# Patient Record
Sex: FEMALE | Race: WHITE | NOT HISPANIC OR LATINO | Employment: OTHER | URBAN - METROPOLITAN AREA
[De-identification: names, ages, dates, MRNs, and addresses within clinical notes are randomized per-mention and may not be internally consistent; named-entity substitution may affect disease eponyms.]

---

## 2017-01-18 ENCOUNTER — APPOINTMENT (OUTPATIENT)
Dept: WOMENS IMAGING | Facility: HOSPITAL | Age: 57
End: 2017-01-18

## 2017-01-18 PROCEDURE — 77067 SCR MAMMO BI INCL CAD: CPT | Performed by: RADIOLOGY

## 2017-01-18 PROCEDURE — 77063 BREAST TOMOSYNTHESIS BI: CPT | Performed by: RADIOLOGY

## 2017-02-08 ENCOUNTER — OFFICE VISIT (OUTPATIENT)
Dept: INTERNAL MEDICINE | Facility: CLINIC | Age: 57
End: 2017-02-08

## 2017-02-08 VITALS
DIASTOLIC BLOOD PRESSURE: 63 MMHG | HEIGHT: 63 IN | TEMPERATURE: 98.2 F | HEART RATE: 68 BPM | BODY MASS INDEX: 27.46 KG/M2 | WEIGHT: 155 LBS | SYSTOLIC BLOOD PRESSURE: 108 MMHG | OXYGEN SATURATION: 96 %

## 2017-02-08 DIAGNOSIS — J30.1 NON-SEASONAL ALLERGIC RHINITIS DUE TO POLLEN: ICD-10-CM

## 2017-02-08 DIAGNOSIS — G25.81 RLS (RESTLESS LEGS SYNDROME): ICD-10-CM

## 2017-02-08 DIAGNOSIS — L91.8 SKIN TAGS, MULTIPLE ACQUIRED: ICD-10-CM

## 2017-02-08 DIAGNOSIS — M81.0 OSTEOPOROSIS: ICD-10-CM

## 2017-02-08 DIAGNOSIS — M77.41 METATARSALGIA OF BOTH FEET: ICD-10-CM

## 2017-02-08 DIAGNOSIS — K21.9 GASTROESOPHAGEAL REFLUX DISEASE WITHOUT ESOPHAGITIS: ICD-10-CM

## 2017-02-08 DIAGNOSIS — G47.39 OTHER SLEEP APNEA: ICD-10-CM

## 2017-02-08 DIAGNOSIS — R09.89 BILATERAL CAROTID BRUITS: ICD-10-CM

## 2017-02-08 DIAGNOSIS — Z00.00 HEALTH CARE MAINTENANCE: Primary | ICD-10-CM

## 2017-02-08 DIAGNOSIS — M77.42 METATARSALGIA OF BOTH FEET: ICD-10-CM

## 2017-02-08 DIAGNOSIS — G60.9 IDIOPATHIC PERIPHERAL NEUROPATHY: ICD-10-CM

## 2017-02-08 PROBLEM — G47.30 SLEEP APNEA: Status: ACTIVE | Noted: 2017-02-08

## 2017-02-08 LAB
ALBUMIN SERPL-MCNC: 4.5 G/DL (ref 3.5–5.2)
ALBUMIN/GLOB SERPL: 1.5 G/DL
ALP SERPL-CCNC: 66 U/L (ref 39–117)
ALT SERPL-CCNC: 17 U/L (ref 1–33)
APPEARANCE UR: (no result)
AST SERPL-CCNC: 18 U/L (ref 1–32)
BACTERIA #/AREA URNS HPF: ABNORMAL /HPF
BASOPHILS # BLD AUTO: 0.02 10*3/MM3 (ref 0–0.2)
BASOPHILS NFR BLD AUTO: 0.3 % (ref 0–1.5)
BILIRUB SERPL-MCNC: 0.4 MG/DL (ref 0.1–1.2)
BILIRUB UR QL STRIP: NEGATIVE
BUN SERPL-MCNC: 15 MG/DL (ref 6–20)
BUN/CREAT SERPL: 17.4 (ref 7–25)
CALCIUM SERPL-MCNC: 9.6 MG/DL (ref 8.6–10.5)
CASTS URNS MICRO: ABNORMAL
CHLORIDE SERPL-SCNC: 102 MMOL/L (ref 98–107)
CHOLEST SERPL-MCNC: 244 MG/DL (ref 0–200)
CO2 SERPL-SCNC: 31.7 MMOL/L (ref 22–29)
COLOR UR: YELLOW
CREAT SERPL-MCNC: 0.86 MG/DL (ref 0.57–1)
EOSINOPHIL # BLD AUTO: 0.07 10*3/MM3 (ref 0–0.7)
EOSINOPHIL NFR BLD AUTO: 0.9 % (ref 0.3–6.2)
EPI CELLS #/AREA URNS HPF: ABNORMAL /HPF
ERYTHROCYTE [DISTWIDTH] IN BLOOD BY AUTOMATED COUNT: 13.1 % (ref 11.7–13)
GLOBULIN SER CALC-MCNC: 3 GM/DL
GLUCOSE SERPL-MCNC: 97 MG/DL (ref 65–99)
GLUCOSE UR QL: NEGATIVE
HCT VFR BLD AUTO: 40.5 % (ref 35.6–45.5)
HDLC SERPL-MCNC: 113 MG/DL (ref 40–60)
HGB BLD-MCNC: 13.1 G/DL (ref 11.9–15.5)
HGB UR QL STRIP: NEGATIVE
IMM GRANULOCYTES # BLD: 0 10*3/MM3 (ref 0–0.03)
IMM GRANULOCYTES NFR BLD: 0 % (ref 0–0.5)
KETONES UR QL STRIP: NEGATIVE
LDLC SERPL CALC-MCNC: 118 MG/DL (ref 0–100)
LDLC/HDLC SERPL: 1.04 {RATIO}
LEUKOCYTE ESTERASE UR QL STRIP: NEGATIVE
LYMPHOCYTES # BLD AUTO: 3.49 10*3/MM3 (ref 0.9–4.8)
LYMPHOCYTES NFR BLD AUTO: 45 % (ref 19.6–45.3)
MCH RBC QN AUTO: 30.1 PG (ref 26.9–32)
MCHC RBC AUTO-ENTMCNC: 32.3 G/DL (ref 32.4–36.3)
MCV RBC AUTO: 93.1 FL (ref 80.5–98.2)
MONOCYTES # BLD AUTO: 0.69 10*3/MM3 (ref 0.2–1.2)
MONOCYTES NFR BLD AUTO: 8.9 % (ref 5–12)
NEUTROPHILS # BLD AUTO: 3.49 10*3/MM3 (ref 1.9–8.1)
NEUTROPHILS NFR BLD AUTO: 44.9 % (ref 42.7–76)
NITRITE UR QL STRIP: NEGATIVE
PH UR STRIP: 8 [PH] (ref 5–8)
PLATELET # BLD AUTO: 317 10*3/MM3 (ref 140–500)
POTASSIUM SERPL-SCNC: 4.1 MMOL/L (ref 3.5–5.2)
PROT SERPL-MCNC: 7.5 G/DL (ref 6–8.5)
PROT UR QL STRIP: (no result)
RBC # BLD AUTO: 4.35 10*6/MM3 (ref 3.9–5.2)
RBC #/AREA URNS HPF: ABNORMAL /HPF
SODIUM SERPL-SCNC: 145 MMOL/L (ref 136–145)
SP GR UR: 1.02 (ref 1–1.03)
T4 FREE SERPL-MCNC: 1.1 NG/DL (ref 0.93–1.7)
TRIGL SERPL-MCNC: 66 MG/DL (ref 0–150)
TSH SERPL DL<=0.005 MIU/L-ACNC: 1.5 MIU/ML (ref 0.27–4.2)
UROBILINOGEN UR STRIP-MCNC: (no result) MG/DL
VLDLC SERPL CALC-MCNC: 13.2 MG/DL (ref 5–40)
WBC # BLD AUTO: 7.76 10*3/MM3 (ref 4.5–10.7)
WBC #/AREA URNS HPF: ABNORMAL /HPF

## 2017-02-08 PROCEDURE — 93000 ELECTROCARDIOGRAM COMPLETE: CPT | Performed by: INTERNAL MEDICINE

## 2017-02-08 PROCEDURE — 99214 OFFICE O/P EST MOD 30 MIN: CPT | Performed by: INTERNAL MEDICINE

## 2017-02-08 PROCEDURE — 99396 PREV VISIT EST AGE 40-64: CPT | Performed by: INTERNAL MEDICINE

## 2017-02-08 RX ORDER — BUPROPION HYDROCHLORIDE 100 MG/1
100 TABLET ORAL 2 TIMES DAILY
Qty: 180 TABLET | Refills: 1 | Status: SHIPPED | OUTPATIENT
Start: 2017-02-08 | End: 2018-08-30

## 2017-02-08 NOTE — PROGRESS NOTES
Procedure     ECG 12 Lead  Date/Time: 2/8/2017 10:20 AM  Performed by: BOUCHRA TODD  Authorized by: BOUCHRA TODD   Comparison: not compared with previous ECG   Previous ECG: no previous ECG available  Rhythm: sinus rhythm  Rate: normal  Conduction: conduction normal  ST Segments: ST segments normal  T Waves: T waves normal  QRS axis: normal  Other: no other findings  Clinical impression: normal ECG

## 2017-02-08 NOTE — PROGRESS NOTES
Subjective   Citlali Bocanegra is a 56 y.o. female.   She is here today for complete physical exam along with allergic rhinitis bilateral carotid bruits idiopathic peripheral neuropathy restless leg syndrome metatarsalgia both feet multiple prior skin tags and GERD along with sleep apnea  History of Present Illness   She is here today for complete physical exam except for gynecological part along with allergic rhinitis bilateral carotid bruits idiopathic peripheral neuropathy restless leg syndrome metatarsalgia both feet multiple skin tags and GERD along with sleep apnea  The following portions of the patient's history were reviewed and updated as appropriate: allergies, current medications, past family history, past medical history, past social history, past surgical history and problem list.    Review of Systems   Musculoskeletal: Positive for arthralgias (multiple joints and both feet).   Skin:        Multiple skin tags on thorax   Neurological: Numbness: peripheral neuropathy.   Psychiatric/Behavioral: Positive for dysphoric mood and sleep disturbance (sleep apnea).   All other systems reviewed and are negative.      Objective   Physical Exam   Constitutional: She is oriented to person, place, and time. Vital signs are normal. She appears well-developed and well-nourished. She is active.   HENT:   Head: Normocephalic and atraumatic.   Right Ear: Hearing, tympanic membrane, external ear and ear canal normal.   Left Ear: Hearing, tympanic membrane, external ear and ear canal normal.   Nose: Nose normal.   Mouth/Throat: Oropharynx is clear and moist.   Eyes: Conjunctivae, EOM and lids are normal. Pupils are equal, round, and reactive to light. Right eye exhibits no discharge. Left eye exhibits no discharge.   Neck: Trachea normal, normal range of motion, full passive range of motion without pain and phonation normal. Neck supple. Carotid bruit is present (bilateral). No edema present. No thyroid mass and no thyromegaly  present.   Cardiovascular: Normal rate, regular rhythm, normal heart sounds, intact distal pulses and normal pulses.  Exam reveals no gallop and no friction rub.    No murmur heard.  Pulmonary/Chest: Effort normal and breath sounds normal. No respiratory distress. She has no wheezes. She has no rales.   Abdominal: Soft. Normal appearance, normal aorta and bowel sounds are normal. She exhibits no distension, no abdominal bruit and no mass. There is no hepatosplenomegaly. There is no tenderness. There is no rebound, no guarding and no CVA tenderness. No hernia. Hernia confirmed negative in the right inguinal area and confirmed negative in the left inguinal area.   Musculoskeletal: Normal range of motion. She exhibits no edema or tenderness.       Vascular Status -  Her exam exhibits right foot vasculature normal. Her exam exhibits no right foot edema. Her exam exhibits left foot vasculature normal. Her exam exhibits no left foot edema.   Skin Integrity  -  Her right foot skin is intact.     Citlali 's left foot skin is intact. .     Lymphadenopathy:     She has no cervical adenopathy.     She has no axillary adenopathy.        Right: No inguinal and no supraclavicular adenopathy present.        Left: No inguinal and no supraclavicular adenopathy present.   Neurological: She is alert and oriented to person, place, and time. She has normal strength. No cranial nerve deficit or sensory deficit. She exhibits normal muscle tone. She displays a negative Romberg sign. Coordination normal.   Skin: Skin is warm, dry and intact. No cyanosis. Nails show no clubbing.        Psychiatric: She has a normal mood and affect. Her speech is normal and behavior is normal. Judgment and thought content normal. Cognition and memory are normal.   Nursing note and vitals reviewed.      Assessment/Plan   Diagnoses and all orders for this visit:    Health care maintenance  -     CBC & Differential  -     Comprehensive Metabolic Panel  -     T4,  Free  -     TSH  -     Urinalysis With Microscopic  -     Lipid Panel With LDL / HDL Ratio  -     XR Chest PA & Lateral  -     ECG 12 Lead    Non-seasonal allergic rhinitis due to pollen  -     CBC & Differential  -     Comprehensive Metabolic Panel  -     T4, Free  -     TSH  -     Urinalysis With Microscopic  -     Lipid Panel With LDL / HDL Ratio    Bilateral carotid bruits  -     CBC & Differential  -     Comprehensive Metabolic Panel  -     T4, Free  -     TSH  -     Urinalysis With Microscopic  -     Lipid Panel With LDL / HDL Ratio    Idiopathic peripheral neuropathy  -     CBC & Differential  -     Comprehensive Metabolic Panel  -     T4, Free  -     TSH  -     Urinalysis With Microscopic  -     Lipid Panel With LDL / HDL Ratio    RLS (restless legs syndrome)  -     CBC & Differential  -     Comprehensive Metabolic Panel  -     T4, Free  -     TSH  -     Urinalysis With Microscopic  -     Lipid Panel With LDL / HDL Ratio    Metatarsalgia of both feet  -     CBC & Differential  -     Comprehensive Metabolic Panel  -     T4, Free  -     TSH  -     Urinalysis With Microscopic  -     Lipid Panel With LDL / HDL Ratio    Osteoporosis  -     CBC & Differential  -     Comprehensive Metabolic Panel  -     T4, Free  -     TSH  -     Urinalysis With Microscopic  -     Lipid Panel With LDL / HDL Ratio    Skin tags, multiple acquired  -     Ambulatory Referral to Dermatology    Gastroesophageal reflux disease without esophagitis    Other sleep apnea  -     Ambulatory Referral to Sleep Medicine    Other orders  -     buPROPion (WELLBUTRIN) 100 MG tablet; Take 1 tablet by mouth 2 (Two) Times a Day.  -     Microscopic Examination      Healthcare maintenance fasting labs vaccines colonoscopies pelvic exams mammograms DEXA scans on a regular basis  Osteoporosis regular DEXA scans bisphosphonates calcium vitamin D  Skin tags refer to dermatology  GERD stable on current medication  Sleep apnea refer to sleep  medicine  Depression stable and we will try bupropion  Metatarsalgia both feet refer to orthopedic surgery as needed  Restless leg syndrome supportive meds for this  Bilateral carotid bruits regular carotid Dopplers  Allergic rhinitis supportive meds for this  Idiopathic peripheral neuropathy supportive meds

## 2017-02-09 ENCOUNTER — HOSPITAL ENCOUNTER (OUTPATIENT)
Dept: GENERAL RADIOLOGY | Facility: HOSPITAL | Age: 57
Discharge: HOME | End: 2017-02-09
Attending: INTERNAL MEDICINE | Admitting: INTERNAL MEDICINE

## 2017-02-09 PROCEDURE — 71020 HC CHEST PA AND LATERAL: CPT

## 2017-03-16 RX ORDER — CITALOPRAM 10 MG/1
TABLET ORAL
Qty: 30 TABLET | Refills: 5 | Status: SHIPPED | OUTPATIENT
Start: 2017-03-16 | End: 2017-09-28 | Stop reason: SDUPTHER

## 2017-04-21 ENCOUNTER — TELEPHONE (OUTPATIENT)
Dept: INTERNAL MEDICINE | Facility: CLINIC | Age: 57
End: 2017-04-21

## 2017-04-21 RX ORDER — MECLIZINE HYDROCHLORIDE 25 MG/1
25 TABLET ORAL 3 TIMES DAILY PRN
Qty: 30 TABLET | Refills: 0 | Status: SHIPPED | OUTPATIENT
Start: 2017-04-21 | End: 2018-03-12 | Stop reason: SDUPTHER

## 2017-05-01 ENCOUNTER — APPOINTMENT (OUTPATIENT)
Dept: SLEEP MEDICINE | Facility: HOSPITAL | Age: 57
End: 2017-05-01

## 2017-09-28 RX ORDER — CITALOPRAM 10 MG/1
TABLET ORAL
Qty: 30 TABLET | Refills: 0 | Status: SHIPPED | OUTPATIENT
Start: 2017-09-28 | End: 2017-10-27 | Stop reason: SDUPTHER

## 2017-10-30 RX ORDER — CITALOPRAM 10 MG/1
TABLET ORAL
Qty: 30 TABLET | Refills: 5 | Status: SHIPPED | OUTPATIENT
Start: 2017-10-30 | End: 2018-04-21 | Stop reason: SDUPTHER

## 2017-12-18 DIAGNOSIS — L60.0 IGTN (INGROWING TOE NAIL): Primary | ICD-10-CM

## 2018-01-29 ENCOUNTER — OFFICE (OUTPATIENT)
Dept: URBAN - METROPOLITAN AREA OTHER 6 | Facility: OTHER | Age: 58
End: 2018-01-29

## 2018-01-29 VITALS
HEART RATE: 68 BPM | DIASTOLIC BLOOD PRESSURE: 66 MMHG | HEIGHT: 63 IN | WEIGHT: 165 LBS | SYSTOLIC BLOOD PRESSURE: 112 MMHG

## 2018-01-29 DIAGNOSIS — K21.9 GASTRO-ESOPHAGEAL REFLUX DISEASE WITHOUT ESOPHAGITIS: ICD-10-CM

## 2018-01-29 PROCEDURE — 99213 OFFICE O/P EST LOW 20 MIN: CPT

## 2018-04-23 RX ORDER — CITALOPRAM 10 MG/1
TABLET ORAL
Qty: 30 TABLET | Refills: 0 | Status: SHIPPED | OUTPATIENT
Start: 2018-04-23 | End: 2018-04-26 | Stop reason: SDUPTHER

## 2018-04-27 RX ORDER — CITALOPRAM 10 MG/1
TABLET ORAL
Qty: 30 TABLET | Refills: 5 | Status: SHIPPED | OUTPATIENT
Start: 2018-04-27 | End: 2018-11-17 | Stop reason: SDUPTHER

## 2018-06-28 ENCOUNTER — TELEPHONE (OUTPATIENT)
Dept: INTERNAL MEDICINE | Facility: CLINIC | Age: 58
End: 2018-06-28

## 2018-06-28 RX ORDER — DOXYCYCLINE HYCLATE 100 MG
100 TABLET ORAL 2 TIMES DAILY
Qty: 20 TABLET | Refills: 0 | Status: SHIPPED | OUTPATIENT
Start: 2018-06-28 | End: 2018-08-30

## 2018-08-27 ENCOUNTER — TELEPHONE (OUTPATIENT)
Dept: INTERNAL MEDICINE | Facility: CLINIC | Age: 58
End: 2018-08-27

## 2018-08-28 NOTE — TELEPHONE ENCOUNTER
Pt would like to know if an antibiotic would help or if there is a cream that would help her. Please advise.

## 2018-08-30 ENCOUNTER — OFFICE VISIT (OUTPATIENT)
Dept: INTERNAL MEDICINE | Facility: CLINIC | Age: 58
End: 2018-08-30

## 2018-08-30 VITALS
BODY MASS INDEX: 28.35 KG/M2 | RESPIRATION RATE: 20 BRPM | HEART RATE: 70 BPM | TEMPERATURE: 98.5 F | HEIGHT: 63 IN | OXYGEN SATURATION: 96 % | SYSTOLIC BLOOD PRESSURE: 100 MMHG | WEIGHT: 160 LBS | DIASTOLIC BLOOD PRESSURE: 65 MMHG

## 2018-08-30 DIAGNOSIS — M75.81 ROTATOR CUFF TENDINITIS, RIGHT: ICD-10-CM
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
## 2018-08-30 DIAGNOSIS — S90.862S: ICD-10-CM

## 2018-08-30 DIAGNOSIS — K60.2 RECTAL FISSURE: ICD-10-CM

## 2018-08-30 DIAGNOSIS — L91.8 SKIN TAGS, MULTIPLE ACQUIRED: ICD-10-CM

## 2018-08-30 DIAGNOSIS — M81.6 LOCALIZED OSTEOPOROSIS WITHOUT CURRENT PATHOLOGICAL FRACTURE: ICD-10-CM

## 2018-08-30 DIAGNOSIS — Z00.00 HEALTH CARE MAINTENANCE: Primary | ICD-10-CM

## 2018-08-30 DIAGNOSIS — F32.0 MILD SINGLE CURRENT EPISODE OF MAJOR DEPRESSIVE DISORDER (HCC): ICD-10-CM

## 2018-08-30 DIAGNOSIS — K62.5 BRBPR (BRIGHT RED BLOOD PER RECTUM): ICD-10-CM

## 2018-08-30 DIAGNOSIS — K21.9 GASTROESOPHAGEAL REFLUX DISEASE WITHOUT ESOPHAGITIS: ICD-10-CM

## 2018-08-30 DIAGNOSIS — J30.1 CHRONIC SEASONAL ALLERGIC RHINITIS DUE TO POLLEN: ICD-10-CM

## 2018-08-30 PROCEDURE — 99214 OFFICE O/P EST MOD 30 MIN: CPT | Performed by: INTERNAL MEDICINE

## 2018-08-30 PROCEDURE — 99396 PREV VISIT EST AGE 40-64: CPT | Performed by: INTERNAL MEDICINE

## 2018-08-30 RX ORDER — AZELASTINE 1 MG/ML
2 SPRAY, METERED NASAL DAILY
COMMUNITY
Start: 2018-08-08

## 2018-08-30 RX ORDER — OMEPRAZOLE 40 MG/1
CAPSULE, DELAYED RELEASE ORAL
COMMUNITY
Start: 2018-08-08 | End: 2020-02-06

## 2018-08-30 RX ORDER — METRONIDAZOLE 500 MG/1
500 TABLET ORAL 3 TIMES DAILY
Qty: 36 TABLET | Refills: 0 | Status: SHIPPED | OUTPATIENT
Start: 2018-08-30 | End: 2018-10-03

## 2018-08-30 RX ORDER — CIPROFLOXACIN 500 MG/1
500 TABLET, FILM COATED ORAL EVERY 12 HOURS SCHEDULED
Qty: 24 TABLET | Refills: 0 | Status: SHIPPED | OUTPATIENT
Start: 2018-08-30 | End: 2018-10-03

## 2018-09-04 ENCOUNTER — HOSPITAL ENCOUNTER (OUTPATIENT)
Dept: MRI IMAGING | Facility: HOSPITAL | Age: 58
Discharge: HOME | End: 2018-09-04

## 2018-09-05 LAB
ALBUMIN SERPL-MCNC: 4.2 G/DL (ref 3.5–5.2)
ALBUMIN/GLOB SERPL: 1.4 G/DL
ALP SERPL-CCNC: 73 U/L (ref 39–117)
ALT SERPL-CCNC: 18 U/L (ref 1–33)
APPEARANCE UR: CLEAR
AST SERPL-CCNC: 17 U/L (ref 1–32)
B BURGDOR IGG+IGM SER-ACNC: <0.91 ISR (ref 0–0.9)
B BURGDOR IGM SER IA-ACNC: <0.8 INDEX (ref 0–0.79)
BACTERIA #/AREA URNS HPF: ABNORMAL /HPF
BASOPHILS # BLD AUTO: 0.02 10*3/MM3 (ref 0–0.2)
BASOPHILS NFR BLD AUTO: 0.3 % (ref 0–1.5)
BILIRUB SERPL-MCNC: 0.4 MG/DL (ref 0.1–1.2)
BILIRUB UR QL STRIP: NEGATIVE
BUN SERPL-MCNC: 12 MG/DL (ref 8–23)
BUN/CREAT SERPL: 15.4 (ref 7–25)
CALCIUM SERPL-MCNC: 9.6 MG/DL (ref 8.6–10.5)
CASTS URNS MICRO: ABNORMAL
CHLORIDE SERPL-SCNC: 103 MMOL/L (ref 98–107)
CHOLEST SERPL-MCNC: 243 MG/DL (ref 0–200)
CO2 SERPL-SCNC: 26.3 MMOL/L (ref 22–29)
COLOR UR: YELLOW
CREAT SERPL-MCNC: 0.78 MG/DL (ref 0.57–1)
EOSINOPHIL # BLD AUTO: 0.07 10*3/MM3 (ref 0–0.7)
EOSINOPHIL NFR BLD AUTO: 1.1 % (ref 0.3–6.2)
EPI CELLS #/AREA URNS HPF: ABNORMAL /HPF
ERYTHROCYTE [DISTWIDTH] IN BLOOD BY AUTOMATED COUNT: 13.1 % (ref 11.7–13)
GLOBULIN SER CALC-MCNC: 3.1 GM/DL
GLUCOSE SERPL-MCNC: 90 MG/DL (ref 65–99)
GLUCOSE UR QL: NEGATIVE
HCT VFR BLD AUTO: 42 % (ref 35.6–45.5)
HDLC SERPL-MCNC: 91 MG/DL (ref 40–60)
HGB BLD-MCNC: 13.1 G/DL (ref 11.9–15.5)
HGB UR QL STRIP: NEGATIVE
IMM GRANULOCYTES # BLD: 0 10*3/MM3 (ref 0–0.03)
IMM GRANULOCYTES NFR BLD: 0 % (ref 0–0.5)
KETONES UR QL STRIP: NEGATIVE
LDLC SERPL CALC-MCNC: 137 MG/DL (ref 0–100)
LDLC/HDLC SERPL: 1.51 {RATIO}
LEUKOCYTE ESTERASE UR QL STRIP: NEGATIVE
LYMPHOCYTES # BLD AUTO: 2.95 10*3/MM3 (ref 0.9–4.8)
LYMPHOCYTES NFR BLD AUTO: 48 % (ref 19.6–45.3)
MCH RBC QN AUTO: 28.7 PG (ref 26.9–32)
MCHC RBC AUTO-ENTMCNC: 31.2 G/DL (ref 32.4–36.3)
MCV RBC AUTO: 92.1 FL (ref 80.5–98.2)
MONOCYTES # BLD AUTO: 0.51 10*3/MM3 (ref 0.2–1.2)
MONOCYTES NFR BLD AUTO: 8.3 % (ref 5–12)
NEUTROPHILS # BLD AUTO: 2.6 10*3/MM3 (ref 1.9–8.1)
NEUTROPHILS NFR BLD AUTO: 42.3 % (ref 42.7–76)
NITRITE UR QL STRIP: NEGATIVE
PH UR STRIP: 7 [PH] (ref 5–8)
PLATELET # BLD AUTO: 307 10*3/MM3 (ref 140–500)
POTASSIUM SERPL-SCNC: 4.1 MMOL/L (ref 3.5–5.2)
PROT SERPL-MCNC: 7.3 G/DL (ref 6–8.5)
PROT UR QL STRIP: NEGATIVE
R RICKETTSI IGG SER QL IA: NEGATIVE
R RICKETTSI IGM SER-ACNC: 0.18 INDEX (ref 0–0.89)
RBC # BLD AUTO: 4.56 10*6/MM3 (ref 3.9–5.2)
RBC #/AREA URNS HPF: ABNORMAL /HPF
SODIUM SERPL-SCNC: 141 MMOL/L (ref 136–145)
SP GR UR: 1.02 (ref 1–1.03)
T4 FREE SERPL-MCNC: 1.19 NG/DL (ref 0.93–1.7)
TRIGL SERPL-MCNC: 74 MG/DL (ref 0–150)
TSH SERPL DL<=0.005 MIU/L-ACNC: 2.21 MIU/ML (ref 0.27–4.2)
UROBILINOGEN UR STRIP-MCNC: (no result) MG/DL
VLDLC SERPL CALC-MCNC: 14.8 MG/DL (ref 5–40)
WBC # BLD AUTO: 6.15 10*3/MM3 (ref 4.5–10.7)
WBC #/AREA URNS HPF: ABNORMAL /HPF

## 2018-09-07 ENCOUNTER — TELEPHONE (OUTPATIENT)
Dept: INTERNAL MEDICINE | Facility: CLINIC | Age: 58
End: 2018-09-07

## 2018-09-13 NOTE — PROGRESS NOTES
Subjective   Citlali Bocanegra is a 58 y.o. female.   She is here today for complete physical exam along with bright red blood per rectum and rectal fissure and GERD and osteoporosis and depression and allergic rhinitis and right rotator cuff tendinitis and tick bite of the foot on the left follow-up and multiple skin tags  History of Present Illness   She is here today for him for complete physical exam along with bright red blood per rectum which is not stable and rectal fissure which is not stable and GERD which is stable on current medication and osteoporosis which has been stable and depression which is stable on current medication and allergic rhinitis which is stable on current medication and right rotator cuff tendinitis which is not stable and tick bite on the foot on the left which is getting worse according to her and multiple skin tags which bother her as well  The following portions of the patient's history were reviewed and updated as appropriate: allergies, current medications, past family history, past medical history, past social history, past surgical history and problem list.    Review of Systems   Gastrointestinal: Positive for anal bleeding and rectal pain.   Skin:        Multiple skin tags and tick bite of left foot   All other systems reviewed and are negative.      Objective   Physical Exam   Constitutional: She is oriented to person, place, and time. Vital signs are normal. She appears well-developed and well-nourished. She is active.   HENT:   Head: Normocephalic and atraumatic.   Right Ear: Hearing, tympanic membrane, external ear and ear canal normal.   Left Ear: Hearing, tympanic membrane, external ear and ear canal normal.   Nose: Nose normal.   Mouth/Throat: Uvula is midline, oropharynx is clear and moist and mucous membranes are normal.   Eyes: Pupils are equal, round, and reactive to light. Conjunctivae, EOM and lids are normal. Right eye exhibits no discharge. Left eye exhibits no  discharge.   Neck: Trachea normal, normal range of motion, full passive range of motion without pain and phonation normal. Neck supple. Carotid bruit is not present. No edema present. No thyroid mass and no thyromegaly present.   Cardiovascular: Normal rate, regular rhythm, normal heart sounds, intact distal pulses and normal pulses.  Exam reveals no gallop and no friction rub.    No murmur heard.  Pulmonary/Chest: Effort normal and breath sounds normal. No respiratory distress. She has no wheezes. She has no rales. Right breast exhibits no inverted nipple, no mass, no nipple discharge, no skin change and no tenderness. Left breast exhibits no inverted nipple, no mass, no nipple discharge, no skin change and no tenderness. Breasts are symmetrical. There is no breast swelling.   Abdominal: Soft. Normal appearance, normal aorta and bowel sounds are normal. She exhibits no distension, no abdominal bruit and no mass. There is no hepatosplenomegaly. There is no tenderness. There is no rebound, no guarding and no CVA tenderness. No hernia. Hernia confirmed negative in the right inguinal area and confirmed negative in the left inguinal area.   Genitourinary: Rectal exam shows fissure and guaiac positive stool. No breast tenderness, discharge or bleeding.   Musculoskeletal: She exhibits no edema.        Right shoulder: She exhibits decreased range of motion, tenderness and pain.     Vascular Status -  Her right foot exhibits normal foot vasculature  and no edema. Her left foot exhibits normal foot vasculature  and no edema.  Skin Integrity  -  Her right foot skin is intact.Her left foot skin is intact..     Lymphadenopathy:     She has no cervical adenopathy.     She has no axillary adenopathy.        Right: No inguinal and no supraclavicular adenopathy present.        Left: No inguinal and no supraclavicular adenopathy present.   Neurological: She is alert and oriented to person, place, and time. She has normal strength.  No cranial nerve deficit or sensory deficit. She exhibits normal muscle tone. She displays a negative Romberg sign. Coordination normal.   Skin: Skin is warm, dry and intact. No cyanosis. Nails show no clubbing.        Psychiatric: She has a normal mood and affect. Her speech is normal and behavior is normal. Judgment and thought content normal. Cognition and memory are normal.   Nursing note and vitals reviewed.      Assessment/Plan   Citlali was seen today for annual exam and rectal tear.    Diagnoses and all orders for this visit:    Health care maintenance  -     Lipid Panel With LDL / HDL Ratio  -     CBC & Differential  -     Comprehensive Metabolic Panel  -     T4, Free  -     TSH  -     Urinalysis With Microscopic - Urine, Clean Catch  -     Ambulatory Referral For Screening Colonoscopy    Rectal fissure    BRBPR (bright red blood per rectum)    Gastroesophageal reflux disease without esophagitis    Localized osteoporosis without current pathological fracture    Mild single current episode of major depressive disorder (CMS/HCC)    Chronic seasonal allergic rhinitis due to pollen    Rotator cuff tendinitis, right  -     MRI Shoulder Right Without Contrast    Tick bite of foot, left, sequela  -     Lyme Disease IgG/IgM Antibodies  -     [place]  (IgG / M)    Skin tags, multiple acquired  -     Ambulatory Referral to Dermatology    Other orders  -     ciprofloxacin (CIPRO) 500 MG tablet; Take 1 tablet by mouth Every 12 (Twelve) Hours.  -     metroNIDAZOLE (FLAGYL) 500 MG tablet; Take 1 tablet by mouth 3 (Three) Times a Day.  -     Microscopic Examination  -     [place] SF (IgG / M)  -     B. Burgdorferi Antibodies, WB Reflex      Healthcare maintenance fasting labs vaccines colonoscopies mammograms breast exams on a regular basis  Rectal fissure noted and we will provide antibacterial medication today and get her to GI doctor if this does not improve drastically in the next few days  Bright red  blood per rectum noted and not stable and we will check labs on this and hopefully this will resolve  GERD stable on current medication no changes  Osteoporosis has been stable on current medication with no changes  Depression stable on current medication  Allergic rhinitis stable on current medication  Right rotator cuff tendinitis not stable and we will get MRI of right shoulder  Tick bite foot left follow-up we will get Lyme disease and [place] spotted fever workup  Multiple acquired skin tags we will refer to dermatology

## 2018-09-21 ENCOUNTER — HOSPITAL ENCOUNTER (OUTPATIENT)
Dept: MRI IMAGING | Facility: HOSPITAL | Age: 58
Discharge: HOME | End: 2018-09-21
Admitting: INTERNAL MEDICINE

## 2018-09-21 PROCEDURE — 73221 MRI JOINT UPR EXTREM W/O DYE: CPT

## 2018-10-03 ENCOUNTER — OFFICE VISIT (OUTPATIENT)
Dept: INTERNAL MEDICINE | Facility: CLINIC | Age: 58
End: 2018-10-03

## 2018-10-03 VITALS
HEIGHT: 63 IN | TEMPERATURE: 97.2 F | BODY MASS INDEX: 28.17 KG/M2 | HEART RATE: 55 BPM | DIASTOLIC BLOOD PRESSURE: 74 MMHG | SYSTOLIC BLOOD PRESSURE: 126 MMHG | OXYGEN SATURATION: 98 % | WEIGHT: 159 LBS

## 2018-10-03 DIAGNOSIS — M75.81 ROTATOR CUFF TENDONITIS, RIGHT: ICD-10-CM

## 2018-10-03 DIAGNOSIS — Z12.11 ENCOUNTER FOR SCREENING COLONOSCOPY: ICD-10-CM

## 2018-10-03 DIAGNOSIS — S39.92XA INJURY OF BACK, INITIAL ENCOUNTER: ICD-10-CM

## 2018-10-03 DIAGNOSIS — Z82.49 FAMILY HISTORY OF EARLY CAD: Primary | ICD-10-CM

## 2018-10-03 PROCEDURE — 99213 OFFICE O/P EST LOW 20 MIN: CPT | Performed by: INTERNAL MEDICINE

## 2018-10-18 ENCOUNTER — APPOINTMENT (OUTPATIENT)
Dept: WOMENS IMAGING | Facility: HOSPITAL | Age: 58
End: 2018-10-18

## 2018-10-18 PROCEDURE — 77063 BREAST TOMOSYNTHESIS BI: CPT | Performed by: RADIOLOGY

## 2018-10-18 PROCEDURE — 77067 SCR MAMMO BI INCL CAD: CPT | Performed by: RADIOLOGY

## 2018-10-18 NOTE — PROGRESS NOTES
Subjective   Citlali Bocanegra is a 58 y.o. female.   She is here today for family history of early CAD along with injury of back about a week ago when she slipped on the stairs of her camper/RV and here for an encounter for screening colonoscopy and also right rotator cuff tendinitis she is getting worse  History of Present Illness     The following portions of the patient's history were reviewed and updated as appropriate: allergies, current medications, past family history, past medical history, past social history, past surgical history and problem list.    Review of Systems   Respiratory: Negative for shortness of breath.    Cardiovascular: Negative for chest pain.   Musculoskeletal: Positive for arthralgias and back pain.   All other systems reviewed and are negative.      Objective   Physical Exam   Constitutional: She is oriented to person, place, and time. She appears well-developed and well-nourished. She is cooperative.   HENT:   Head: Normocephalic and atraumatic.   Right Ear: Hearing, tympanic membrane, external ear and ear canal normal.   Left Ear: Hearing, tympanic membrane, external ear and ear canal normal.   Nose: Nose normal.   Mouth/Throat: Uvula is midline, oropharynx is clear and moist and mucous membranes are normal.   Eyes: Pupils are equal, round, and reactive to light. Conjunctivae, EOM and lids are normal.   Neck: Phonation normal. Neck supple. Carotid bruit is not present.   Cardiovascular: Normal rate, regular rhythm and normal heart sounds.  Exam reveals no gallop and no friction rub.    No murmur heard.  Pulmonary/Chest: Effort normal and breath sounds normal. No respiratory distress.   Abdominal: Soft. Bowel sounds are normal. She exhibits no distension and no mass. There is no hepatosplenomegaly. There is no tenderness. There is no rebound and no guarding. No hernia.   Musculoskeletal: She exhibits no edema.        Right shoulder: She exhibits decreased range of motion and tenderness.         Lumbar back: She exhibits tenderness and pain.        Back:    Neurological: She is alert and oriented to person, place, and time. Coordination and gait normal.   Skin: Skin is warm and dry.   Psychiatric: She has a normal mood and affect. Her speech is normal and behavior is normal. Judgment and thought content normal.   Nursing note and vitals reviewed.      Assessment/Plan   Diagnoses and all orders for this visit:    Family history of early CAD    Injury of back, initial encounter    Encounter for screening colonoscopy  -     Ambulatory Referral For Screening Colonoscopy    Rotator cuff tendonitis, right  -     Ambulatory Referral to Orthopedic Surgery      Family history of early CAD this time we will follow closely  Injury of back initial encounter we will provide medication for this as needed  Encounter for screening colonoscopy we will schedule this  Right rotator cuff tendinitis getting worse and we will have her see orthopedic surgery

## 2018-10-29 DIAGNOSIS — Z12.11 ENCOUNTER FOR SCREENING COLONOSCOPY: Primary | ICD-10-CM

## 2018-11-19 ENCOUNTER — PREP FOR SURGERY (OUTPATIENT)
Dept: OTHER | Facility: HOSPITAL | Age: 58
End: 2018-11-19

## 2018-11-19 DIAGNOSIS — Z12.11 COLON CANCER SCREENING: Primary | ICD-10-CM

## 2018-11-19 RX ORDER — CITALOPRAM 10 MG/1
TABLET ORAL
Qty: 90 TABLET | Refills: 3 | Status: SHIPPED | OUTPATIENT
Start: 2018-11-19 | End: 2019-12-17 | Stop reason: SDUPTHER

## 2018-12-13 PROBLEM — Z12.11 COLON CANCER SCREENING: Status: ACTIVE | Noted: 2018-12-13

## 2019-01-10 ENCOUNTER — HOSPITAL ENCOUNTER (OUTPATIENT)
Facility: HOSPITAL | Age: 59
Setting detail: HOSPITAL OUTPATIENT SURGERY
Discharge: HOME | End: 2019-01-10
Attending: SURGERY | Admitting: SURGERY

## 2019-01-10 ENCOUNTER — ANESTHESIA EVENT (OUTPATIENT)
Dept: GASTROENTEROLOGY | Facility: HOSPITAL | Age: 59
End: 2019-01-10

## 2019-01-10 ENCOUNTER — ANESTHESIA (OUTPATIENT)
Dept: GASTROENTEROLOGY | Facility: HOSPITAL | Age: 59
End: 2019-01-10

## 2019-01-10 VITALS
TEMPERATURE: 97.8 F | OXYGEN SATURATION: 100 % | DIASTOLIC BLOOD PRESSURE: 63 MMHG | HEIGHT: 63 IN | RESPIRATION RATE: 18 BRPM | BODY MASS INDEX: 27.82 KG/M2 | HEART RATE: 62 BPM | WEIGHT: 157 LBS | SYSTOLIC BLOOD PRESSURE: 117 MMHG

## 2019-01-10 PROCEDURE — S0260 H&P FOR SURGERY: HCPCS | Performed by: SURGERY

## 2019-01-10 PROCEDURE — 25010000002 PROPOFOL 10 MG/ML EMULSION: Performed by: ANESTHESIOLOGY

## 2019-01-10 PROCEDURE — 45378 DIAGNOSTIC COLONOSCOPY: CPT | Performed by: SURGERY

## 2019-01-10 RX ORDER — PROPOFOL 10 MG/ML
VIAL (ML) INTRAVENOUS CONTINUOUS PRN
Status: DISCONTINUED | OUTPATIENT
Start: 2019-01-10 | End: 2019-01-10 | Stop reason: SURG

## 2019-01-10 RX ORDER — SODIUM CHLORIDE, SODIUM LACTATE, POTASSIUM CHLORIDE, CALCIUM CHLORIDE 600; 310; 30; 20 MG/100ML; MG/100ML; MG/100ML; MG/100ML
1000 INJECTION, SOLUTION INTRAVENOUS CONTINUOUS
Status: DISCONTINUED | OUTPATIENT
Start: 2019-01-10 | End: 2019-01-10

## 2019-01-10 RX ORDER — LIDOCAINE HYDROCHLORIDE 10 MG/ML
0.5 INJECTION, SOLUTION INFILTRATION; PERINEURAL ONCE AS NEEDED
Status: DISCONTINUED | OUTPATIENT
Start: 2019-01-10 | End: 2019-01-10 | Stop reason: HOSPADM

## 2019-01-10 RX ORDER — SODIUM CHLORIDE 0.9 % (FLUSH) 0.9 %
3 SYRINGE (ML) INJECTION AS NEEDED
Status: DISCONTINUED | OUTPATIENT
Start: 2019-01-10 | End: 2019-01-10 | Stop reason: HOSPADM

## 2019-01-10 RX ORDER — PROPOFOL 10 MG/ML
VIAL (ML) INTRAVENOUS AS NEEDED
Status: DISCONTINUED | OUTPATIENT
Start: 2019-01-10 | End: 2019-01-10 | Stop reason: SURG

## 2019-01-10 RX ORDER — SODIUM CHLORIDE 9 MG/ML
50 INJECTION, SOLUTION INTRAVENOUS ONCE
Status: COMPLETED | OUTPATIENT
Start: 2019-01-10 | End: 2019-01-10

## 2019-01-10 RX ORDER — LIDOCAINE HYDROCHLORIDE 20 MG/ML
INJECTION, SOLUTION INFILTRATION; PERINEURAL AS NEEDED
Status: DISCONTINUED | OUTPATIENT
Start: 2019-01-10 | End: 2019-01-10 | Stop reason: SURG

## 2019-01-10 RX ADMIN — PROPOFOL 100 MCG/KG/MIN: 10 INJECTION, EMULSION INTRAVENOUS at 08:14

## 2019-01-10 RX ADMIN — LIDOCAINE HYDROCHLORIDE 60 MG: 20 INJECTION, SOLUTION INFILTRATION; PERINEURAL at 08:14

## 2019-01-10 RX ADMIN — PROPOFOL 100 MG: 10 INJECTION, EMULSION INTRAVENOUS at 08:14

## 2019-01-10 RX ADMIN — SODIUM CHLORIDE 50 ML/HR: 9 INJECTION, SOLUTION INTRAVENOUS at 07:51

## 2019-01-10 NOTE — ANESTHESIA POSTPROCEDURE EVALUATION
Patient: Citlali Bocanegra    Procedure Summary     Date:  01/10/19 Room / Location:  [facility] ENDOSCOPY 4 /  [facility]    Anesthesia Start:  0806 Anesthesia Stop:  0843    Procedure:  COLONOSCOPY TO CECUM (N/A ) Diagnosis:       Colon cancer screening      (Colon cancer screening [Z12.11])    Surgeon:  Sridhar Altamirano MD Provider:  Bre Pisano MD    Anesthesia Type:  MAC ASA Status:  2          Anesthesia Type: MAC  Last vitals  BP   118/69 (01/10/19 0731)   Temp   36.6 °C (97.8 °F) (01/10/19 0731)   Pulse   60 (01/10/19 0731)   Resp   16 (01/10/19 0731)     SpO2   98 % (01/10/19 0731)     Post Anesthesia Care and Evaluation    Patient location during evaluation: bedside  Patient participation: complete - patient participated  Level of consciousness: awake  Pain management: adequate  Airway patency: patent  Anesthetic complications: No anesthetic complications    Cardiovascular status: acceptable  Respiratory status: acceptable  Hydration status: acceptable

## 2019-01-10 NOTE — ANESTHESIA PREPROCEDURE EVALUATION
Anesthesia Evaluation     NPO Solid Status: > 8 hours             Airway   Mallampati: I  TM distance: >3 FB  Neck ROM: full  No difficulty expected  Dental - normal exam     Pulmonary - normal exam   (+) sleep apnea,   (-) asthma  Cardiovascular - normal exam        Neuro/Psych  GI/Hepatic/Renal/Endo    (+)  GERD,      Musculoskeletal     Abdominal  - normal exam    Bowel sounds: normal.   Substance History      OB/GYN          Other   (+) arthritis                   Anesthesia Plan    ASA 2     MAC     intravenous induction   Anesthetic plan, all risks, benefits, and alternatives have been provided, discussed and informed consent has been obtained with: patient.

## 2019-01-10 NOTE — H&P
Subjective:   Reason for Visit: Screening colonoscopy    HPI:  Patient presents for evaluation for colon cancer screening.  There is no family history of colon neoplasia. No family hx of gastric, ovarian, or uterine cancer.  Patient denies changes in bowel habits, diarrhea, constipation, abdominal pain, decreased caliber of stool, melena, brbpr and weight loss.  There is no personal or family history of bleeding disorder or untoward reaction to anesthesia.  Patient has had a colonoscopy 10 year(s) ago.      Past Medical History:   Diagnosis Date   • Depression    • Environmental allergies    • GERD (gastroesophageal reflux disease)        Past Surgical History:   Procedure Laterality Date   • COLONOSCOPY     • ENDOSCOPY           Current Facility-Administered Medications:   •  lidocaine (XYLOCAINE) 1 % injection 0.5 mL, 0.5 mL, Intradermal, Once PRN, Sridhar Altamirano MD  •  sodium chloride 0.9 % flush 3 mL, 3 mL, Intravenous, PRN, Sridhar Altamirano MD    Facility-Administered Medications Ordered in Other Encounters:   •  lidocaine (XYLOCAINE) 2% injection, , , PRN, Bre Pisano MD, 60 mg at 01/10/19 0814  •  Propofol (DIPRIVAN) injection, , Intravenous, PRN, Bre Pisano MD, 100 mg at 01/10/19 0814  •  Propofol (DIPRIVAN) injection, , , Continuous PRN, Bre Pisano MD, Last Rate: 42.7 mL/hr at 01/10/19 0814, 100 mcg/kg/min at 01/10/19 0814    Allergies   Allergen Reactions   • Aspirin    • Entex T  [Pseudoephedrine-Guaifenesin]    • Ibuprofen        Family History   Problem Relation Age of Onset   • Heart failure Mother    • Hypertension Mother    • Heart failure Father    • Diabetes Father    • Glaucoma Father        Social History     Socioeconomic History   • Marital status:      Spouse name: Not on file   • Number of children: Not on file   • Years of education: Not on file   • Highest education level: Not on file   Social Needs   • Financial resource strain: Not on file   • Food  "insecurity - worry: Not on file   • Food insecurity - inability: Not on file   • Transportation needs - medical: Not on file   • Transportation needs - non-medical: Not on file   Occupational History   • Not on file   Tobacco Use   • Smoking status: Former Smoker     Last attempt to quit: 1/10/1981     Years since quittin.0   • Smokeless tobacco: Never Used   Substance and Sexual Activity   • Alcohol use: No   • Drug use: No   • Sexual activity: Defer   Other Topics Concern   • Not on file   Social History Narrative   • Not on file        Review Of Systems:  A comprehensive review of systems was negative.    Objective:   Physical exam:  /69 (BP Location: Left arm, Patient Position: Sitting)   Pulse 60   Temp 97.8 °F (36.6 °C) (Oral)   Resp 16   Ht 160 cm (63\")   Wt 71.2 kg (157 lb)   SpO2 98%   BMI 27.81 kg/m²     General Appearance:  awake, alert, oriented, in no acute distress and well developed, well nourished  Lungs:  Normal expansion.  Clear to auscultation.  No rales, rhonchi, or wheezing.  Heart:  Heart sounds are normal.  Regular rate and rhythm without murmur, gallop or rub.  Abdomen:  Soft, non-tender, normal bowel sounds; no bruits, organomegaly or masses.    Assessment:    Citlali Bocanegra is a 58 y.o. female who presents for evaluation for colon cancer screening.    Patient has no increased risk factors or warning signs or symptoms.  I reviewed current ACG guidelines for colon cancer screening:    A)  Flex sig q 5yrs with yearly fecal occult blood testing  B)  Virtual colonoscopy  C)  Colonoscopy with possible biopsy/polypectomy with IV sedation at appropriate       screening intervals    The patient has no family history of colon cancer.  She  has no personal history of colon polyp and colon cancer who presents for colon cancer screening evaluation.   I would advise a colonscopy.     The rationale for each option as well as all risks and benefits of each alternative were discussed with " the patient in detail.  The patient understands and does wish to proceed with Colonoscopy Screening        The rationale for colonoscopy with possible biopsy, possible polypectomy, with IV sedation as well as all risks, benefits, and alternatives were discussed with the patient in detail. Risks including but not limited to perforation, bleeding,need for blood transfusion or emergent surgery ,and missed neoplasm were reviewed in detail with the patient The patient demonstrates full understanding and wishes to proceed with the colonoscopy.

## 2019-01-11 NOTE — ADDENDUM NOTE
Addendum  created 01/10/19 2106 by Oz Hayes MD    Review and Sign - Ready for Procedure, Review and Sign - Signed

## 2019-04-18 ENCOUNTER — OFFICE VISIT (OUTPATIENT)
Dept: INTERNAL MEDICINE | Facility: CLINIC | Age: 59
End: 2019-04-18

## 2019-04-18 VITALS
TEMPERATURE: 98.9 F | HEIGHT: 63 IN | DIASTOLIC BLOOD PRESSURE: 68 MMHG | OXYGEN SATURATION: 98 % | SYSTOLIC BLOOD PRESSURE: 105 MMHG | WEIGHT: 142.6 LBS | RESPIRATION RATE: 16 BRPM | BODY MASS INDEX: 25.27 KG/M2 | HEART RATE: 60 BPM

## 2019-04-18 DIAGNOSIS — J35.8 TONSIL STONE: Primary | ICD-10-CM

## 2019-04-18 LAB
EXPIRATION DATE: NORMAL
INTERNAL CONTROL: NORMAL
Lab: NORMAL
S PYO AG THROAT QL: NEGATIVE

## 2019-04-18 PROCEDURE — 99212 OFFICE O/P EST SF 10 MIN: CPT | Performed by: NURSE PRACTITIONER

## 2019-04-18 PROCEDURE — 87880 STREP A ASSAY W/OPTIC: CPT | Performed by: NURSE PRACTITIONER

## 2019-04-18 NOTE — PROGRESS NOTES
"Subjective   Citlali Bocanegra is a 58 y.o. female.   CC: Tonsil exudate    Presents for evaluation of possible tonsil stones.  This is a 58-year-old female patient of Dr. Causey.  She has a history of osteoporosis, GERD, depression, allergic rhinitis.  She does see an allergist and takes Singulair and Zyrtec daily.  She states that yesterday evening she noticed small white patches on the top of the bilateral tonsils.  She states that she is not having a sore throat, but this morning did have a \"twinge\" of pain when she swallowed on the left side of her throat.  She is having no trouble swallowing.  She denies any upper respiratory symptoms including sinus pain or pressure, cough, congestion.  She has had no fever or chills.  She denies any known recent exposure to upper respiratory infections or strep.  She denies development of any other new issues today.         The following portions of the patient's history were reviewed and updated as appropriate: allergies, current medications, past family history, past medical history, past social history, past surgical history and problem list.    Review of Systems   Constitutional: Negative for activity change, chills, fatigue, fever, unexpected weight gain and unexpected weight loss.   HENT: Positive for sore throat (  Slight pain with swallowing, left side of throat, isolated incidence today). Negative for congestion, hearing loss, postnasal drip, sinus pressure, sneezing and tinnitus.    Eyes: Negative for photophobia, pain and visual disturbance.   Respiratory: Negative for cough, chest tightness, shortness of breath and wheezing.    Cardiovascular: Negative for chest pain, palpitations and leg swelling.   Gastrointestinal: Negative for abdominal distention, abdominal pain, constipation, diarrhea, nausea and vomiting.   Endocrine: Negative for polydipsia, polyphagia and polyuria.   Genitourinary: Negative for dysuria, frequency, hematuria and urgency.   Neurological: " "Negative for dizziness, weakness, numbness and headache.   All other systems reviewed and are negative.      Objective    /68 (BP Location: Left arm, Patient Position: Sitting, Cuff Size: Small Adult)   Pulse 60   Temp 98.9 °F (37.2 °C) (Oral)   Resp 16   Ht 160 cm (63\")   Wt 64.7 kg (142 lb 9.6 oz)   SpO2 98%   BMI 25.26 kg/m²     Physical Exam   Constitutional: She is oriented to person, place, and time. She appears well-developed and well-nourished. No distress.   HENT:   Head: Normocephalic and atraumatic.   Right Ear: External ear normal.   Left Ear: External ear normal.   Nose: Nose normal.   Mouth/Throat: Oropharyngeal exudate present.       Tonsil stones to bilateral upper tonsils   Eyes: Conjunctivae and EOM are normal. Pupils are equal, round, and reactive to light. Right eye exhibits no discharge. Left eye exhibits no discharge.   Neck: Normal range of motion. Neck supple. No JVD present. No tracheal deviation present. No thyromegaly present.   Cardiovascular: Normal rate, regular rhythm, normal heart sounds and intact distal pulses. Exam reveals no gallop and no friction rub.   No murmur heard.  Pulmonary/Chest: Effort normal and breath sounds normal. No stridor. No respiratory distress. She has no wheezes. She has no rales. She exhibits no tenderness.   Lungs are CTA bilaterally   Musculoskeletal: Normal range of motion.   Lymphadenopathy:     She has no cervical adenopathy.   Neurological: She is alert and oriented to person, place, and time.   Skin: Skin is warm and dry. Capillary refill takes less than 2 seconds. She is not diaphoretic.   Psychiatric: She has a normal mood and affect. Her behavior is normal. Judgment and thought content normal.   Nursing note and vitals reviewed.    Current outpatient and discharge medications have been reconciled for the patient.  Reviewed by: ALISA Cornell    Lab Results   Component Value Date    RAPSCRN Negative 04/18/2019       Assessment/Plan "   Citlali was seen today for follow-up.    Diagnoses and all orders for this visit:    Tonsil stone  -     POCT rapid strep A    -Strep test was negative.  I believe she has bilateral tonsil stones.  I have asked her to follow-up with ENT.  She will try warm gargles tonight to help break up the stones.  She is having no sore throat.  Continue with Singulair and Zyrtec as well.    -Follow-up if symptoms persist or worsen.  Follow-up routinely with PCP, Dr. Causey.

## 2019-10-23 ENCOUNTER — APPOINTMENT (OUTPATIENT)
Dept: WOMENS IMAGING | Facility: HOSPITAL | Age: 59
End: 2019-10-23

## 2019-10-23 PROCEDURE — 77063 BREAST TOMOSYNTHESIS BI: CPT | Performed by: RADIOLOGY

## 2019-10-23 PROCEDURE — 77067 SCR MAMMO BI INCL CAD: CPT | Performed by: RADIOLOGY

## 2019-12-17 RX ORDER — CITALOPRAM 10 MG/1
TABLET ORAL
Qty: 90 TABLET | Refills: 0 | OUTPATIENT
Start: 2019-12-17

## 2020-02-06 ENCOUNTER — OFFICE VISIT (OUTPATIENT)
Dept: INTERNAL MEDICINE | Facility: CLINIC | Age: 60
End: 2020-02-06

## 2020-02-06 VITALS
HEART RATE: 67 BPM | DIASTOLIC BLOOD PRESSURE: 80 MMHG | SYSTOLIC BLOOD PRESSURE: 110 MMHG | HEIGHT: 63 IN | BODY MASS INDEX: 26.93 KG/M2 | OXYGEN SATURATION: 98 % | WEIGHT: 152 LBS

## 2020-02-06 DIAGNOSIS — G60.9 IDIOPATHIC PERIPHERAL NEUROPATHY: Chronic | ICD-10-CM

## 2020-02-06 DIAGNOSIS — K21.9 GASTROESOPHAGEAL REFLUX DISEASE WITHOUT ESOPHAGITIS: Primary | ICD-10-CM

## 2020-02-06 DIAGNOSIS — F32.0 CURRENT MILD EPISODE OF MAJOR DEPRESSIVE DISORDER WITHOUT PRIOR EPISODE (HCC): Chronic | ICD-10-CM

## 2020-02-06 DIAGNOSIS — J30.1 SEASONAL ALLERGIC RHINITIS DUE TO POLLEN: ICD-10-CM

## 2020-02-06 DIAGNOSIS — M81.6 LOCALIZED OSTEOPOROSIS WITHOUT CURRENT PATHOLOGICAL FRACTURE: ICD-10-CM

## 2020-02-06 DIAGNOSIS — K22.2 LOWER ESOPHAGEAL RING: ICD-10-CM

## 2020-02-06 PROCEDURE — 99214 OFFICE O/P EST MOD 30 MIN: CPT | Performed by: INTERNAL MEDICINE

## 2020-02-06 RX ORDER — LEVOCETIRIZINE DIHYDROCHLORIDE 5 MG/1
5 TABLET, FILM COATED ORAL DAILY
COMMUNITY
Start: 2019-12-23

## 2020-02-06 NOTE — PROGRESS NOTES
"Subjective   Citlali Bocanegra is a 59 y.o. female.  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  I reviewed her previous medical records testing and labs and recommended that she A)keep up with her gastroenterologist, B) follow-up with her gynecologist and discuss Prolia injections for her osteoporosis and C) start on Zantac 150 mg twice a day since she is no longer taking any type of acid lowering medication at this time.      /80   Pulse 67   Ht 160 cm (62.99\")   Wt 68.9 kg (152 lb)   SpO2 98%   BMI 26.93 kg/m²     Body mass index is 26.93 kg/m².    History of Present Illness ongoing issues with indigestion and lower esophageal spasm    The following portions of the patient's history were reviewed and updated as appropriate: allergies, current medications, past family history, past medical history, past social history, past surgical history and problem list.    Review of Systems   Constitutional: Negative.    Eyes: Negative.    Respiratory: Negative.    Cardiovascular: Negative.    Gastrointestinal: Positive for indigestion.   Musculoskeletal: Positive for arthralgias.       Objective   Physical Exam   Constitutional: She is oriented to person, place, and time. She appears well-developed and well-nourished.   HENT:   Head: Normocephalic and atraumatic.   Cardiovascular: Normal rate, regular rhythm and normal heart sounds.   Pulmonary/Chest: Effort normal and breath sounds normal.   Abdominal: Soft. Bowel sounds are normal.   Musculoskeletal: Normal range of motion.   Neurological: She is " alert and oriented to person, place, and time.   Skin: Skin is warm and dry.   Psychiatric: She has a normal mood and affect. Her behavior is normal. Judgment and thought content normal.   Nursing note and vitals reviewed.        Assessment/Plan   Citlali was seen today for annual exam and depression.    Diagnoses and all orders for this visit:    Gastroesophageal reflux disease without esophagitis  Comments:  well controlled    Lower esophageal ring  Comments:  carefull with food intake    Localized osteoporosis without current pathological fracture  Comments:  i strongly suggest prolia    Idiopathic peripheral neuropathy  Comments:  stable without any medications    Current mild episode of major depressive disorder without prior episode (CMS/formerly [facility])  Comments:  doing well currently    Seasonal allergic rhinitis due to pollen  Comments:  continue current regimen

## 2020-02-19 ENCOUNTER — OFFICE VISIT (OUTPATIENT)
Dept: SURGERY | Facility: CLINIC | Age: 60
End: 2020-02-19

## 2020-02-19 VITALS — HEART RATE: 75 BPM | HEIGHT: 63 IN | BODY MASS INDEX: 27.54 KG/M2 | WEIGHT: 155.4 LBS | OXYGEN SATURATION: 98 %

## 2020-02-19 DIAGNOSIS — R13.19 ESOPHAGEAL DYSPHAGIA: ICD-10-CM

## 2020-02-19 DIAGNOSIS — R09.89 GLOBUS SENSATION: Primary | ICD-10-CM

## 2020-02-19 PROCEDURE — 99214 OFFICE O/P EST MOD 30 MIN: CPT | Performed by: SURGERY

## 2020-02-19 RX ORDER — OMEPRAZOLE 40 MG/1
40 CAPSULE, DELAYED RELEASE ORAL DAILY
COMMUNITY
End: 2020-02-19 | Stop reason: ALTCHOICE

## 2020-02-19 RX ORDER — OMEPRAZOLE 20 MG/1
20 CAPSULE, DELAYED RELEASE ORAL DAILY
COMMUNITY
End: 2020-02-19 | Stop reason: ALTCHOICE

## 2020-02-20 NOTE — PROGRESS NOTES
CC: Evaluation for GERD     HPI: The patient is a very pleasant 59-year-old female that is here today for evaluation of GERD and possible options for medication.  The patient reports longstanding history of what she calls reflux.  Her symptoms include feeling that the food gets stuck at the upper chest, dysphagia mostly to solids as well as belching and flatulence.  She denies any heartburn or regurgitation.  She reports limiting dysphagia to solids and sometimes to liquids.  She denies any hoarseness, chest pain.  She was initially treated with Prilosec by Dr. De La Cruz.as per patient perform an extensive work-up with upper endoscopy as well as esophageal manometry.  Upper endoscopy may have shown a Schatzki ring.  He underwent what she describes as esophageal manometry.  She was diagnosed with swallowing disorder although she is just not recall that any specific name was given to the disease.  She was treated with Prilosec 40 mg with good control of her symptoms.  She was switched to 20 mg daily and she reports worsening of her symptoms.  She was not prescribed Zantac by her primary care physician because she is concerned about long-term use of PPI and antiacid.  She denies any recent weight loss     PMH: Depression, GERD, migraines     PSH: Colonoscopy 2019, Dr. Altamirano, upper endoscopy with Dr. De La Cruz 2014    MEDS: Astelin, Celexa, xysal     ALL: Aspirin, EntexT, Nuprin    FH and SH: Family history is noncontributory to the current issue.  No family history of achalasia or esophageal cancer     ROS:   Constitutional: denies any weight changes, fatigue or weakness.  HEENT: Reports congestion, ear pain, postnasal drip and dysphagia  Cardiovascular: denies chest pain, palpitations, edemas.  Respiratory: denies cough, sputum, SOB.  Gastrointestinal: Reports constipation, denies diarrhea   genitourinary: denies dysuria, frequency.  Endocrine: denies cold intolerance, lethargy and flushing.  Hem: denies excessive bruising and  "postop bleeding.  Musculoskeletal: denies weakness, joint swelling, pain or stiffness.  Neuro: Reports dizziness and lightheadedness  Skin: denies change in nevi, rashes, masses.     PE:   Vitals:    02/19/20 1519   Pulse: 75   SpO2: 98%   Weight: 70.5 kg (155 lb 6.4 oz)   Height: 160 cm (63\")     Alert and oriented ×3, no acute distress.  Head is normocephalic and atraumatic.  Neck is supple there is no thyromegaly or lymphadenopathy  Chest is clear bilaterally there is no added sounds  Regular rate and rhythm, no murmurs  Abdomen is soft and nontender, is nondistended, bowel sounds are positive.  There is no rebound or guarding is and there is no peritoneal signs.  No clubbing cyanosis or edema in lower or upper extremities    Diagnostic studies: None pertinent recurrent issue     Assessment and plan    The patient is a very pleasant 59-year-old female that is patient of Dr. De La Cruz that has been treated by him for a esophageal disorder the patient is now aware of.  Her symptoms are not consistent with GERD.  She only seemed to have dysphagia.  Questionable history of Schatzki ring.  I am unsure how PPI was taking care of her symptoms.  She is very happy with the care that was getting from Dr. De La Cruz but he did not have an appointment to see her for the next this month so she decided to come to see me.   I discussed with her that we will try to get some records from Dr. De La Cruz to try to understand a little bit more about her past history  For now I recommend that she takes over-the-counter antiacid medication including Tums and Rolaids.  She can take any over-the-counter antiacid if symptoms are not well controlled with.  I will order an upper GI to assess her swallowing and for any signs of reflux.  For now she does not need to have any type of surgical intervention.  Her symptoms are very mild and will likely be okay with medication only    -Upper GI  - Tums and Rolaids or over-the-counter antiacid for symptom- "   -Gas-X for flatulence     Sridhar Altamirano MD  General, Minimally Invasive and Endoscopic Surgery  [facility]     [address], [address]  [place], [place], [address]  P: [phone]  F: [phone]

## 2020-03-10 ENCOUNTER — TELEPHONE (OUTPATIENT)
Dept: SURGERY | Facility: CLINIC | Age: 60
End: 2020-03-10

## 2020-03-10 ENCOUNTER — HOSPITAL ENCOUNTER (OUTPATIENT)
Dept: GENERAL RADIOLOGY | Facility: HOSPITAL | Age: 60
Discharge: HOME | End: 2020-03-10
Admitting: SURGERY

## 2020-03-10 DIAGNOSIS — R13.19 ESOPHAGEAL DYSPHAGIA: Primary | ICD-10-CM

## 2020-03-10 DIAGNOSIS — R09.89 GLOBUS SENSATION: ICD-10-CM

## 2020-03-10 PROCEDURE — 74240 X-RAY XM UPR GI TRC 1CNTRST: CPT

## 2020-03-10 RX ADMIN — BARIUM SULFATE 135 ML: 980 POWDER, FOR SUSPENSION ORAL at 10:17

## 2020-03-10 NOTE — TELEPHONE ENCOUNTER
I spoke with the patient regarding her esophagram results.    There is dysmotility of the distal esophagus with tertiary wave formation.  A Schatzki ring of the lower esophagus.  There is delay of the transit through the GE junction.  The tablet of barium stay at the GE junction tail dissolve.  No other abnormality was found  Discussed with the patient about further step.  Recommend that she undergoes upper endoscopy with possible esophageal dilation.   I think the next thing for her will be to undergo esophageal manometry.  She understood    Risk and benefits of the upper endoscopy including bleeding, infection, esophageal perforation were discussed in detail with the patient that verbalized understanding and agreed with the plan    Sridhar Altamirano MD, FACS  General, Minimally Invasive and Endoscopic Surgery  [facility]    [address], [address]  [place], [place], [address]  P: [phone]  F: [phone]

## 2020-03-12 ENCOUNTER — OFFICE VISIT (OUTPATIENT)
Dept: INTERNAL MEDICINE | Facility: CLINIC | Age: 60
End: 2020-03-12

## 2020-03-12 ENCOUNTER — TELEPHONE (OUTPATIENT)
Dept: INTERNAL MEDICINE | Facility: CLINIC | Age: 60
End: 2020-03-12

## 2020-03-12 VITALS
OXYGEN SATURATION: 98 % | WEIGHT: 154 LBS | HEART RATE: 72 BPM | DIASTOLIC BLOOD PRESSURE: 70 MMHG | BODY MASS INDEX: 27.29 KG/M2 | HEIGHT: 63 IN | SYSTOLIC BLOOD PRESSURE: 110 MMHG

## 2020-03-12 DIAGNOSIS — K21.9 GASTROESOPHAGEAL REFLUX DISEASE WITHOUT ESOPHAGITIS: Primary | ICD-10-CM

## 2020-03-12 DIAGNOSIS — M25.511 CHRONIC RIGHT SHOULDER PAIN: ICD-10-CM

## 2020-03-12 DIAGNOSIS — G89.29 CHRONIC RIGHT SHOULDER PAIN: ICD-10-CM

## 2020-03-12 DIAGNOSIS — J30.1 SEASONAL ALLERGIC RHINITIS DUE TO POLLEN: ICD-10-CM

## 2020-03-12 DIAGNOSIS — R13.19 ESOPHAGEAL DYSPHAGIA: ICD-10-CM

## 2020-03-12 DIAGNOSIS — Z00.00 ANNUAL PHYSICAL EXAM: ICD-10-CM

## 2020-03-12 PROCEDURE — 99396 PREV VISIT EST AGE 40-64: CPT | Performed by: INTERNAL MEDICINE

## 2020-03-12 NOTE — PROGRESS NOTES
"Subjective   Citlali Bocanegra is a 59 y.o. female.  Patient presents with a chief complaint of  Needing a general physical examination she has a history of gastroesophageal reflux disease and associated dysphasia secondary to stricture that has been addressed with dilation procedures per EGD and allergic rhinitis both of which are doing very well today.  Overall she is doing well keeping up with her mammography colonoscopy immunizations as required and we had a long discussion about healthy habits such as diet exercise avoidance of tobacco of any kind and only moderate alcohol use.    /70   Pulse 72   Ht 160 cm (62.99\")   Wt 69.9 kg (154 lb)   SpO2 98%   BMI 27.29 kg/m²     Body mass index is 27.29 kg/m².    History of Present Illness doing well overall but is having issues with chronic right shoulder pain from previous injury and progressive dysphasia for which her GI doctor is currently working with her.    The following portions of the patient's history were reviewed and updated as appropriate: allergies, current medications, past family history, past medical history, past social history, past surgical history and problem list.    Review of Systems   Constitutional: Negative.    HENT: Negative.    Respiratory: Negative.    Cardiovascular: Negative.    Gastrointestinal: Negative.    Genitourinary: Negative.    Musculoskeletal: Positive for arthralgias.   Neurological: Negative.    Psychiatric/Behavioral: Negative.        Objective   Physical Exam   Constitutional: She is oriented to person, place, and time. She appears well-developed and well-nourished.   HENT:   Head: Normocephalic and atraumatic.   Eyes: Pupils are equal, round, and reactive to light. EOM are normal.   Cardiovascular: Normal rate, regular rhythm and normal heart sounds.   Pulmonary/Chest: Effort normal and breath sounds normal.   Abdominal: Soft. Bowel sounds are normal.   Musculoskeletal: Normal range of motion.   Right shoulder pain " with range of motion   Neurological: She is alert and oriented to person, place, and time.   Skin: Skin is warm.   Psychiatric: She has a normal mood and affect. Her behavior is normal. Judgment and thought content normal.   Nursing note and vitals reviewed.        Assessment/Plan   Citlali was seen today for annual exam.    Diagnoses and all orders for this visit:    Gastroesophageal reflux disease without esophagitis  Comments:  well controlled    Seasonal allergic rhinitis due to pollen  Comments:  doing well    Annual physical exam  Comments:  Full set of labs pertinent to the patient's age and gender    Esophageal dysphagia  Comments:  Continue follow-up with her GI doctor    Chronic right shoulder pain  Comments:  Physical therapy  Orders:  -     Ambulatory Referral to Physical Therapy Evaluate and treat

## 2020-03-12 NOTE — TELEPHONE ENCOUNTER
PATIENT REPORTS THAT DURING HER APPOINTMENT TODAY THE DOCTOR RECOMMEND PHYSICAL THERAPY FOR HER RIGHT SHOULDER. PATIENT WANTS TO KNOW WHAT THE NEXT STEP IS.    PLEASE ADVISE [phone]

## 2020-03-12 NOTE — ADDENDUM NOTE
Addended by: DEANGELO GUEVARA on: 3/12/2020 09:35 AM     Modules accepted: Orders, Level of Service

## 2020-03-13 NOTE — TELEPHONE ENCOUNTER
Pt informed of PT order and they will contact her to set up eliot with her, if that didn't help we can refer her to Ortho for further evaluation. Pt understood well.

## 2020-03-18 ENCOUNTER — TELEPHONE (OUTPATIENT)
Dept: SURGERY | Facility: CLINIC | Age: 60
End: 2020-03-18

## 2020-03-18 NOTE — TELEPHONE ENCOUNTER
Patient was returning call for scheduling her EGD. Patient states she would like to wait until after the Covid-19 virus. Patient will call back when she is ready to schedule.

## 2020-06-24 ENCOUNTER — OFFICE VISIT (OUTPATIENT)
Dept: SURGERY | Facility: CLINIC | Age: 60
End: 2020-06-24

## 2020-06-24 VITALS — HEIGHT: 63 IN | BODY MASS INDEX: 27.29 KG/M2

## 2020-06-24 DIAGNOSIS — K22.2 LOWER ESOPHAGEAL RING (SCHATZKI): ICD-10-CM

## 2020-06-24 DIAGNOSIS — R13.19 ESOPHAGEAL DYSPHAGIA: Primary | ICD-10-CM

## 2020-06-24 PROCEDURE — 99214 OFFICE O/P EST MOD 30 MIN: CPT | Performed by: SURGERY

## 2020-06-24 RX ORDER — OMEPRAZOLE 20 MG/1
CAPSULE, DELAYED RELEASE ORAL
COMMUNITY
Start: 2020-03-18 | End: 2020-06-24 | Stop reason: SDUPTHER

## 2020-06-24 RX ORDER — OMEPRAZOLE 20 MG/1
20 CAPSULE, DELAYED RELEASE ORAL DAILY
Qty: 60 CAPSULE | Refills: 4 | Status: SHIPPED | OUTPATIENT
Start: 2020-06-24 | End: 2020-06-24 | Stop reason: SDUPTHER

## 2020-06-24 RX ORDER — OMEPRAZOLE 20 MG/1
20 CAPSULE, DELAYED RELEASE ORAL DAILY
Qty: 60 CAPSULE | Refills: 4 | Status: SHIPPED | OUTPATIENT
Start: 2020-06-24

## 2020-06-25 NOTE — PROGRESS NOTES
CC: Follow-up dysphagia    HPI: The patient is a very pleasant 60-year-old female that is here today for follow-up.  The patient initially thought her symptoms were consistent with GERD but she was known to have mostly dysphagia to solids.  Her symptoms have improved since last visit since she is less anxious.  She feels her symptoms usually worsen with anxiety and talking.  She has been taking omeprazole 20 mg daily instead of twice daily since her symptoms are well controlled.  She underwent upper GI with contrast that show distal esophageal narrowing consistent with Schatzki ring and dysmotility of the distal esophagus with tertiary wave formation.  Patient was supposed to have an upper endoscopy with esophageal dilation but decided not to have it done because she is not willing to isolate herself for 48 hours after COVID-19 testing.    PMH: Depression, GERD, migraines     PSH: Colonoscopy 2019, Dr. Altamirano, upper endoscopy with esophageal dilation Dr. De La Cruz 2014     MEDS: Astelin, Celexa, xysal     ALL: Aspirin, EntexT, Nuprin     FH and SH: Family history is noncontributory to the current issue.  No family history of achalasia or esophageal cancer     ROS:   Constitutional: denies any weight changes, fatigue or weakness.  HEENT: Reports congestion, ear pain, postnasal drip and dysphagia  Cardiovascular: denies chest pain, palpitations, edemas.  Respiratory: denies cough, sputum, SOB.  Gastrointestinal: Reports constipation, denies diarrhea   genitourinary: denies dysuria, frequency.  Endocrine: denies cold intolerance, lethargy and flushing.  Hem: denies excessive bruising and postop bleeding.  Musculoskeletal: denies weakness, joint swelling, pain or stiffness.  Neuro: Reports dizziness and lightheadedness  Skin: denies change in nevi, rashes, masses.    PE:   Alert and oriented ×3, no acute distress.  Head is normocephalic and atraumatic.  Neck is supple there is no thyromegaly or lymphadenopathy  Chest is clear  bilaterally there is no added sounds  Regular rate and rhythm, no murmurs  Abdomen is soft and nontender, is nondistended, bowel sounds are positive.  There is no rebound or guarding is and there is no peritoneal signs.  No clubbing cyanosis or edema in lower or upper extremities    Upper GI 3/10/2020:   There is distal esophageal dysmotility with tertiary wave formation.  Schatzki ring, small sliding hiatal hernia, barium tablet was retained in the distal esophagus for 15 to 20 minutes.  Contrast passes without problem through the stomach into the duodenum    Upper endoscopy 2014 Dr. De La Cruz: Schatzki ring at the distal esophagus dilated up to 52 [place].  Gastric polyps    Assessment and plan    60-year-old female with dysphagia to solids likely due to Schatzki ring and distal esophageal dysmotility.  She had prior upper endoscopy with dilation due to Schatzki ring.  Discussed with patient about further step.  I recommend that she undergoes upper endoscopy with possible esophageal dilation.  The patient did not schedule her upper endoscopy because she is now willing to isolate for 48 hours after COVID-19 testing for now.  I discussed with her about the risk of worsening dysphagia and food bolus getting stuck in the lower esophagus.  She understood.  I agree with her taking only 20 mg of omeprazole if her symptoms are well controlled.  She does not have any heartburn or regurgitation.    -Plan for upper endoscopy with esophageal dilation.  Risk and benefits of procedure including bleeding, infections, perforation were discussed in detail with the patient.  She verbalized understanding and agree with the plan    -We will need to have COVID-19 testing and remain in isolation for 48 hours after    Sridhar Altamirano MD, FACS  General, Minimally Invasive and Endoscopic Surgery  [facility]    [address] [place] [address], [address]  [place], [place], [address]  P: [phone]  F: [phone]

## 2020-07-22 ENCOUNTER — TRANSCRIBE ORDERS (OUTPATIENT)
Dept: SLEEP MEDICINE | Facility: HOSPITAL | Age: 60
End: 2020-07-22

## 2020-07-22 DIAGNOSIS — Z01.818 OTHER SPECIFIED PRE-OPERATIVE EXAMINATION: Primary | ICD-10-CM

## 2020-07-25 ENCOUNTER — LAB (OUTPATIENT)
Dept: LAB | Facility: HOSPITAL | Age: 60
End: 2020-07-25

## 2020-07-25 DIAGNOSIS — Z01.818 OTHER SPECIFIED PRE-OPERATIVE EXAMINATION: ICD-10-CM

## 2020-07-25 PROCEDURE — C9803 HOPD COVID-19 SPEC COLLECT: HCPCS

## 2020-07-25 PROCEDURE — U0004 COV-19 TEST NON-CDC HGH THRU: HCPCS

## 2020-07-27 LAB
REF LAB TEST METHOD: NORMAL
SARS-COV-2 RNA RESP QL NAA+PROBE: NOT DETECTED

## 2020-07-28 ENCOUNTER — HOSPITAL ENCOUNTER (OUTPATIENT)
Facility: HOSPITAL | Age: 60
Setting detail: HOSPITAL OUTPATIENT SURGERY
Discharge: HOME | End: 2020-07-28
Attending: SURGERY | Admitting: SURGERY

## 2020-07-28 ENCOUNTER — ANESTHESIA EVENT (OUTPATIENT)
Dept: GASTROENTEROLOGY | Facility: HOSPITAL | Age: 60
End: 2020-07-28

## 2020-07-28 ENCOUNTER — ANESTHESIA (OUTPATIENT)
Dept: GASTROENTEROLOGY | Facility: HOSPITAL | Age: 60
End: 2020-07-28

## 2020-07-28 VITALS
HEIGHT: 63 IN | RESPIRATION RATE: 16 BRPM | SYSTOLIC BLOOD PRESSURE: 116 MMHG | TEMPERATURE: 98.2 F | OXYGEN SATURATION: 58 % | BODY MASS INDEX: 28.07 KG/M2 | DIASTOLIC BLOOD PRESSURE: 63 MMHG | HEART RATE: 58 BPM | WEIGHT: 158.4 LBS

## 2020-07-28 DIAGNOSIS — R13.19 ESOPHAGEAL DYSPHAGIA: ICD-10-CM

## 2020-07-28 PROCEDURE — 43239 EGD BIOPSY SINGLE/MULTIPLE: CPT | Performed by: SURGERY

## 2020-07-28 PROCEDURE — 25010000002 PROPOFOL 10 MG/ML EMULSION: Performed by: NURSE ANESTHETIST, CERTIFIED REGISTERED

## 2020-07-28 PROCEDURE — C1726 CATH, BAL DIL, NON-VASCULAR: HCPCS | Performed by: SURGERY

## 2020-07-28 PROCEDURE — S0260 H&P FOR SURGERY: HCPCS | Performed by: SURGERY

## 2020-07-28 PROCEDURE — 43249 ESOPH EGD DILATION <30 MM: CPT | Performed by: SURGERY

## 2020-07-28 PROCEDURE — 88305 TISSUE EXAM BY PATHOLOGIST: CPT | Performed by: SURGERY

## 2020-07-28 RX ORDER — PROPOFOL 10 MG/ML
VIAL (ML) INTRAVENOUS AS NEEDED
Status: DISCONTINUED | OUTPATIENT
Start: 2020-07-28 | End: 2020-07-28 | Stop reason: SURG

## 2020-07-28 RX ORDER — LIDOCAINE HYDROCHLORIDE 20 MG/ML
INJECTION, SOLUTION INFILTRATION; PERINEURAL AS NEEDED
Status: DISCONTINUED | OUTPATIENT
Start: 2020-07-28 | End: 2020-07-28 | Stop reason: SURG

## 2020-07-28 RX ORDER — SODIUM CHLORIDE, SODIUM LACTATE, POTASSIUM CHLORIDE, CALCIUM CHLORIDE 600; 310; 30; 20 MG/100ML; MG/100ML; MG/100ML; MG/100ML
1000 INJECTION, SOLUTION INTRAVENOUS CONTINUOUS
Status: DISCONTINUED | OUTPATIENT
Start: 2020-07-28 | End: 2020-07-28 | Stop reason: HOSPADM

## 2020-07-28 RX ORDER — ONDANSETRON 2 MG/ML
4 INJECTION INTRAMUSCULAR; INTRAVENOUS ONCE AS NEEDED
Status: DISCONTINUED | OUTPATIENT
Start: 2020-07-28 | End: 2020-07-28 | Stop reason: HOSPADM

## 2020-07-28 RX ORDER — PROPOFOL 10 MG/ML
VIAL (ML) INTRAVENOUS CONTINUOUS PRN
Status: DISCONTINUED | OUTPATIENT
Start: 2020-07-28 | End: 2020-07-28 | Stop reason: SURG

## 2020-07-28 RX ADMIN — PROPOFOL 200 MCG/KG/MIN: 10 INJECTION, EMULSION INTRAVENOUS at 08:55

## 2020-07-28 RX ADMIN — LIDOCAINE HYDROCHLORIDE 60 MG: 20 INJECTION, SOLUTION INFILTRATION; PERINEURAL at 08:55

## 2020-07-28 RX ADMIN — SODIUM CHLORIDE, POTASSIUM CHLORIDE, SODIUM LACTATE AND CALCIUM CHLORIDE 1000 ML: 600; 310; 30; 20 INJECTION, SOLUTION INTRAVENOUS at 08:26

## 2020-07-28 RX ADMIN — PROPOFOL 50 MG: 10 INJECTION, EMULSION INTRAVENOUS at 08:55

## 2020-07-28 NOTE — ANESTHESIA PREPROCEDURE EVALUATION
Anesthesia Evaluation     Patient summary reviewed and Nursing notes reviewed   NPO Solid Status: > 8 hours  NPO Liquid Status: > 2 hours           Airway   Mallampati: II  TM distance: >3 FB  Neck ROM: full  Dental - normal exam     Pulmonary - normal exam   (+) sleep apnea,   Cardiovascular - negative cardio ROS and normal exam        Neuro/Psych  (+) headaches, numbness, psychiatric history Depression,     GI/Hepatic/Renal/Endo    (+)  GERD, GI bleeding resolved,     Musculoskeletal (-) negative ROS    Abdominal    Substance History - negative use     OB/GYN negative ob/gyn ROS         Other                        Anesthesia Plan    ASA 3     MAC       Anesthetic plan, all risks, benefits, and alternatives have been provided, discussed and informed consent has been obtained with: patient.

## 2020-07-28 NOTE — ANESTHESIA POSTPROCEDURE EVALUATION
"Patient: Citlali Bocanegra    Procedure Summary     Date:  07/28/20 Room / Location:   [facility] /  [facility]    Anesthesia Start:  0850 Anesthesia Stop:  0915    Procedure:  ESOPHAGOGASTRODUODENOSCOPY with cold polypectomies and balloon dilation 8mm-12mm (N/A Esophagus) Diagnosis:       Esophageal dysphagia      (Esophageal dysphagia [R13.10])    Surgeon:  Sridhar Altamirano MD Provider:  Frederick Owens MD    Anesthesia Type:  MAC ASA Status:  3          Anesthesia Type: MAC    Vitals  Vitals Value Taken Time   /61 7/28/2020  9:15 AM   Temp     Pulse 62 7/28/2020  9:15 AM   Resp 16 7/28/2020  9:15 AM   SpO2 100 % 7/28/2020  9:15 AM           Post Anesthesia Care and Evaluation    Patient location during evaluation: bedside  Patient participation: complete - patient participated  Level of consciousness: awake  Pain score: 2  Pain management: adequate  Airway patency: patent  Anesthetic complications: No anesthetic complications  PONV Status: none  Cardiovascular status: acceptable  Respiratory status: acceptable  Hydration status: acceptable    Comments: /61 (BP Location: Left arm, Patient Position: Lying)   Pulse 62   Temp 36.8 °C (98.2 °F) (Oral)   Resp 16   Ht 160 cm (63\")   Wt 71.8 kg (158 lb 6.4 oz)   SpO2 100%   BMI 28.06 kg/m²         "

## 2020-07-29 LAB
CYTO UR: NORMAL
LAB AP CASE REPORT: NORMAL
PATH REPORT.FINAL DX SPEC: NORMAL
PATH REPORT.GROSS SPEC: NORMAL

## 2020-07-31 ENCOUNTER — TELEPHONE (OUTPATIENT)
Dept: SURGERY | Facility: CLINIC | Age: 60
End: 2020-07-31

## 2020-07-31 NOTE — TELEPHONE ENCOUNTER
I called the patient regarding her endoscopy results    Final Diagnosis   1.  Stomach, Biopsy: Oxyntic type gastric mucosa with               A.  Hyperplastic polyp.               B.  Fundic gland polyp.               C.  No significant inflammation.                D.  No metaplasia, dysplasia or malignancy.                  E.  No H. pylori-like organisms identified.     Discussed with her that I did fine a very short area of narrowing at the lower esophagus that was not fixed and changes with esophageal peristalsis.  I discussed with her that I am not sure that she has a Schatzki ring but just esophageal dysmotility.  Discussed with her to see how she does with dilation, we can always try dilation with larger balloons.  She may need to have esophageal manometry if no improvement and he went trial therapy with calcium channel blockers.  She understood

## 2020-09-17 ENCOUNTER — OFFICE VISIT (OUTPATIENT)
Dept: INTERNAL MEDICINE | Facility: CLINIC | Age: 60
End: 2020-09-17

## 2020-09-17 VITALS
OXYGEN SATURATION: 98 % | SYSTOLIC BLOOD PRESSURE: 122 MMHG | TEMPERATURE: 97.7 F | HEIGHT: 63 IN | WEIGHT: 161 LBS | BODY MASS INDEX: 28.53 KG/M2 | DIASTOLIC BLOOD PRESSURE: 80 MMHG | HEART RATE: 59 BPM

## 2020-09-17 DIAGNOSIS — G89.29 CHRONIC LOW BACK PAIN WITHOUT SCIATICA, UNSPECIFIED BACK PAIN LATERALITY: ICD-10-CM

## 2020-09-17 DIAGNOSIS — F32.0 CURRENT MILD EPISODE OF MAJOR DEPRESSIVE DISORDER WITHOUT PRIOR EPISODE (HCC): ICD-10-CM

## 2020-09-17 DIAGNOSIS — K22.2 LOWER ESOPHAGEAL RING: Primary | ICD-10-CM

## 2020-09-17 DIAGNOSIS — M54.50 CHRONIC LOW BACK PAIN WITHOUT SCIATICA, UNSPECIFIED BACK PAIN LATERALITY: ICD-10-CM

## 2020-09-17 DIAGNOSIS — M81.6 LOCALIZED OSTEOPOROSIS WITHOUT CURRENT PATHOLOGICAL FRACTURE: ICD-10-CM

## 2020-09-17 DIAGNOSIS — Z96.643 HISTORY OF BILATERAL HIP REPLACEMENTS: ICD-10-CM

## 2020-09-17 DIAGNOSIS — K21.9 GASTROESOPHAGEAL REFLUX DISEASE WITHOUT ESOPHAGITIS: ICD-10-CM

## 2020-09-17 LAB
ALBUMIN SERPL-MCNC: 4.6 G/DL (ref 3.5–5.2)
ALBUMIN/GLOB SERPL: 1.8 G/DL
ALP SERPL-CCNC: 94 U/L (ref 39–117)
ALT SERPL-CCNC: 28 U/L (ref 1–33)
AST SERPL-CCNC: 19 U/L (ref 1–32)
BASOPHILS # BLD AUTO: 0.04 10*3/MM3 (ref 0–0.2)
BASOPHILS NFR BLD AUTO: 0.6 % (ref 0–1.5)
BILIRUB SERPL-MCNC: 0.4 MG/DL (ref 0–1.2)
BUN SERPL-MCNC: 10 MG/DL (ref 8–23)
BUN/CREAT SERPL: 11.9 (ref 7–25)
CALCIUM SERPL-MCNC: 9.4 MG/DL (ref 8.6–10.5)
CHLORIDE SERPL-SCNC: 100 MMOL/L (ref 98–107)
CO2 SERPL-SCNC: 29.1 MMOL/L (ref 22–29)
CREAT SERPL-MCNC: 0.84 MG/DL (ref 0.57–1)
EOSINOPHIL # BLD AUTO: 0.04 10*3/MM3 (ref 0–0.4)
EOSINOPHIL NFR BLD AUTO: 0.6 % (ref 0.3–6.2)
ERYTHROCYTE [DISTWIDTH] IN BLOOD BY AUTOMATED COUNT: 12.1 % (ref 12.3–15.4)
GLOBULIN SER CALC-MCNC: 2.6 GM/DL
GLUCOSE SERPL-MCNC: 93 MG/DL (ref 65–99)
HCT VFR BLD AUTO: 37.9 % (ref 34–46.6)
HGB BLD-MCNC: 13 G/DL (ref 12–15.9)
IMM GRANULOCYTES # BLD AUTO: 0.01 10*3/MM3 (ref 0–0.05)
IMM GRANULOCYTES NFR BLD AUTO: 0.2 % (ref 0–0.5)
LYMPHOCYTES # BLD AUTO: 2.85 10*3/MM3 (ref 0.7–3.1)
LYMPHOCYTES NFR BLD AUTO: 45.5 % (ref 19.6–45.3)
MCH RBC QN AUTO: 30.2 PG (ref 26.6–33)
MCHC RBC AUTO-ENTMCNC: 34.3 G/DL (ref 31.5–35.7)
MCV RBC AUTO: 87.9 FL (ref 79–97)
MONOCYTES # BLD AUTO: 0.56 10*3/MM3 (ref 0.1–0.9)
MONOCYTES NFR BLD AUTO: 8.9 % (ref 5–12)
NEUTROPHILS # BLD AUTO: 2.77 10*3/MM3 (ref 1.7–7)
NEUTROPHILS NFR BLD AUTO: 44.2 % (ref 42.7–76)
NRBC BLD AUTO-RTO: 0 /100 WBC (ref 0–0.2)
PLATELET # BLD AUTO: 294 10*3/MM3 (ref 140–450)
POTASSIUM SERPL-SCNC: 4.3 MMOL/L (ref 3.5–5.2)
PROT SERPL-MCNC: 7.2 G/DL (ref 6–8.5)
RBC # BLD AUTO: 4.31 10*6/MM3 (ref 3.77–5.28)
SODIUM SERPL-SCNC: 140 MMOL/L (ref 136–145)
WBC # BLD AUTO: 6.27 10*3/MM3 (ref 3.4–10.8)

## 2020-09-17 PROCEDURE — 90686 IIV4 VACC NO PRSV 0.5 ML IM: CPT | Performed by: INTERNAL MEDICINE

## 2020-09-17 PROCEDURE — 99214 OFFICE O/P EST MOD 30 MIN: CPT | Performed by: INTERNAL MEDICINE

## 2020-09-17 PROCEDURE — 90471 IMMUNIZATION ADMIN: CPT | Performed by: INTERNAL MEDICINE

## 2020-09-17 NOTE — PROGRESS NOTES
"Subjective   Citlali Bocanegra is a 60 y.o. female.  Patient presents with a chief complaint of bilateral hip pain that is slowly worsening, osteoporosis, gastroesophageal reflux disease with episodic esophageal stricture with a recent esophageal dilation, chronic allergic rhinitis, mild depression is well controlled, here for follow-up evaluation and treatment.  We discussed her options regarding her hip pain that she has a long family history of hip replacements in her mother and sister and I recommended an orthopedic evaluation for delineation of future options.      /80   Pulse 59   Temp 97.7 °F (36.5 °C)   Ht 160 cm (63\")   Wt 73 kg (161 lb)   SpO2 98%   BMI 28.52 kg/m²     Body mass index is 28.52 kg/m².    History of Present Illness recently had an esophageal dilation with great success    The following portions of the patient's history were reviewed and updated as appropriate: allergies, current medications, past family history, past medical history, past social history, past surgical history and problem list.    Review of Systems   Constitutional: Negative.    HENT: Positive for trouble swallowing.    Respiratory: Negative.    Cardiovascular: Negative.    Gastrointestinal: Negative.    Musculoskeletal: Negative.    Neurological: Negative.    Psychiatric/Behavioral: Negative.        Objective   Physical Exam  Vitals signs and nursing note reviewed.   Constitutional:       Appearance: Normal appearance.   HENT:      Head: Normocephalic and atraumatic.      Mouth/Throat:      Mouth: Mucous membranes are moist.   Neck:      Musculoskeletal: Normal range of motion and neck supple.   Cardiovascular:      Rate and Rhythm: Normal rate and regular rhythm.      Pulses: Normal pulses.      Heart sounds: Normal heart sounds.   Pulmonary:      Effort: Pulmonary effort is normal.      Breath sounds: Normal breath sounds.   Abdominal:      General: Abdomen is flat.      Palpations: Abdomen is soft. "   Musculoskeletal: Normal range of motion.   Skin:     General: Skin is warm and dry.   Neurological:      General: No focal deficit present.      Mental Status: She is alert and oriented to person, place, and time.   Psychiatric:         Mood and Affect: Mood normal.         Behavior: Behavior normal.         Thought Content: Thought content normal.         Judgment: Judgment normal.           Assessment/Plan   Citlali was seen today for annual exam.    Diagnoses and all orders for this visit:    Lower esophageal ring  Comments:  gi follow up    Gastroesophageal reflux disease without esophagitis  Comments:  well controlled  Orders:  -     Comprehensive metabolic panel; Future  -     CBC w AUTO Differential; Future    Current mild episode of major depressive disorder without prior episode (CMS/[facility])  Comments:  doing well overall    Localized osteoporosis without current pathological fracture  Comments:  continue physical therapy    Chronic low back pain without sciatica, unspecified back pain laterality  Comments:  doing better overall  Orders:  -     Ambulatory Referral to Orthopedic Surgery    History of bilateral hip replacements  -     Ambulatory Referral to Orthopedic Surgery

## 2020-09-18 ENCOUNTER — TELEPHONE (OUTPATIENT)
Dept: INTERNAL MEDICINE | Facility: CLINIC | Age: 60
End: 2020-09-18

## 2020-09-18 NOTE — TELEPHONE ENCOUNTER
PT CALLED STATING SHE HAS HAD PROBLEMS WITH HER RIGHT THUMB AND INDEX FINGER BEING NUMB WHEN SHE WAKES UP, BUT NOW ALL 5 ARE BECOMING NUMB.ITS BEEN GOING ON FOR A MONTH, IT IS RANDOM WHEN IT OCCURS.SHE IS REQUESTING A CALL BACK.     CALL BACK [phone]

## 2020-09-23 NOTE — TELEPHONE ENCOUNTER
Pt wants to hold on the hand surgeon right now because she is having hip pain and asked about the referral to Ortho surgeon which she stated she wants to go specific Ortho not the one that  referred to. Pt will call back with the info and phone # of that Ortho doctor.

## 2020-11-13 RX ORDER — CITALOPRAM 10 MG/1
10 TABLET ORAL DAILY
Qty: 90 TABLET | Refills: 0 | Status: SHIPPED | OUTPATIENT
Start: 2020-11-13 | End: 2021-02-25

## 2020-11-13 NOTE — TELEPHONE ENCOUNTER
Caller: Citlali Bocanegra    Relationship: Self    Best call back number: [phone]     Medication needed:   Requested Prescriptions     Pending Prescriptions Disp Refills   • citalopram (CeleXA) 10 MG tablet 90 tablet 3     Sig: Take 1 tablet by mouth Daily.       When do you need the refill by: ASAP      Does the patient have less than a 3 day supply:  [x] Yes  [] No    What is the patient's preferred pharmacy: [facility] #[id number] [facility] [id number] [facility] - [phone] [facility] [phone] FX

## 2020-11-30 ENCOUNTER — TELEPHONE (OUTPATIENT)
Dept: INTERNAL MEDICINE | Facility: CLINIC | Age: 60
End: 2020-11-30

## 2020-11-30 NOTE — TELEPHONE ENCOUNTER
PT CALLED TO SEE IF DR. MCKINNEY WOULD TAKE HER, HER , TINO KELLY ([birth year]40) AND HER MOTHER (CHARLES MEDRANO 04/25/38) ON AS NEW PATIENTS.     PT'S FATHER, JESUS MEDRANO, IS A CURRENT PATIENT OF DR. MCKINNEY.     SHE CAN BE REACHED [phone]

## 2021-02-11 ENCOUNTER — TELEPHONE (OUTPATIENT)
Dept: FAMILY MEDICINE CLINIC | Facility: CLINIC | Age: 61
End: 2021-02-11

## 2021-02-11 NOTE — TELEPHONE ENCOUNTER
Caller: Citlali Bocanegra    Relationship to patient: Self    Best call back number: [phone] (H)    Patient states that she had made a request several months back in regards to Dr. Harvey taking her on as a new patient. She states that she was told he had agreed to take them on (this is documented in a telephone encounter from 11/30) and she would like to schedule appointment. Dr. Harvey is not currently accepting new patients and therefore his schedule is not available for me to schedule as a new patient.    Patient is requesting call back to schedule.

## 2021-02-25 ENCOUNTER — OFFICE VISIT (OUTPATIENT)
Dept: FAMILY MEDICINE CLINIC | Facility: CLINIC | Age: 61
End: 2021-02-25

## 2021-02-25 VITALS
HEIGHT: 63 IN | HEART RATE: 73 BPM | BODY MASS INDEX: 29.59 KG/M2 | TEMPERATURE: 97.7 F | WEIGHT: 167 LBS | SYSTOLIC BLOOD PRESSURE: 112 MMHG | DIASTOLIC BLOOD PRESSURE: 62 MMHG | OXYGEN SATURATION: 98 %

## 2021-02-25 DIAGNOSIS — J30.1 SEASONAL ALLERGIC RHINITIS DUE TO POLLEN: Primary | ICD-10-CM

## 2021-02-25 DIAGNOSIS — E78.2 HYPERLIPEMIA, MIXED: ICD-10-CM

## 2021-02-25 DIAGNOSIS — F33.1 MODERATE EPISODE OF RECURRENT MAJOR DEPRESSIVE DISORDER (HCC): ICD-10-CM

## 2021-02-25 DIAGNOSIS — M25.559 HIP PAIN: ICD-10-CM

## 2021-02-25 DIAGNOSIS — R20.2 PARESTHESIA: ICD-10-CM

## 2021-02-25 DIAGNOSIS — R53.83 OTHER FATIGUE: ICD-10-CM

## 2021-02-25 DIAGNOSIS — M54.16 LUMBAR RADICULOPATHY: ICD-10-CM

## 2021-02-25 DIAGNOSIS — K21.9 GASTROESOPHAGEAL REFLUX DISEASE WITHOUT ESOPHAGITIS: ICD-10-CM

## 2021-02-25 PROCEDURE — 99214 OFFICE O/P EST MOD 30 MIN: CPT | Performed by: FAMILY MEDICINE

## 2021-02-25 RX ORDER — ESTRADIOL 0.1 MG/G
CREAM VAGINAL
COMMUNITY
Start: 2021-02-12

## 2021-02-25 RX ORDER — CITALOPRAM 10 MG/1
10 TABLET ORAL DAILY
Qty: 90 TABLET | Refills: 1 | Status: SHIPPED | OUTPATIENT
Start: 2021-02-25 | End: 2021-09-24 | Stop reason: SDUPTHER

## 2021-02-25 NOTE — PROGRESS NOTES
"Chief Complaint  Establish Care (Pt is here to establish care. She is c/o burning/tingling sensation on upper/outer right thigh. She is also having pain in left hip. She said when she wakes up she has a hard time stablizing herself  to walk. She feels like her legs are numb. )    Subjective          Citlali Bocanegra presents to [facility]  History of Present Illness  PT is here to get established and   Allergies stable with med  gerd- no dysphagia  Depression stable with med and no HI or SI.  Need refills.    Hyperlipidemia- No myalgia.  No Complaints.  Stable.   She is having some burning and tingling in outer thigh.  She also has some left hip pain.  She has a hard time stabilizing herself when walking at times.  She has had ABIs and Nerve conduction studies in past and all was normal.    She has had MRI of spine Lumbar in 2015 with mild ligamentum flavum thickening and some ddd.    She states some of her tingling and numbness on outer thigh is a new thing.  She has a strong family hx of hip replacements.       Objective   Vital Signs:   /62   Pulse 73   Temp 97.7 °F (36.5 °C)   Ht 160 cm (63\")   Wt 75.8 kg (167 lb)   SpO2 98%   BMI 29.58 kg/m²     Physical Exam  Vitals signs and nursing note reviewed.   Constitutional:       General: She is not in acute distress.     Appearance: Normal appearance. She is not ill-appearing.   HENT:      Head: Normocephalic and atraumatic.      Right Ear: Tympanic membrane, ear canal and external ear normal. There is no impacted cerumen.      Left Ear: Tympanic membrane, ear canal and external ear normal. There is no impacted cerumen.   Neck:      Musculoskeletal: Normal range of motion and neck supple. No muscular tenderness.      Vascular: Carotid bruit present.   Cardiovascular:      Rate and Rhythm: Normal rate and regular rhythm.      Heart sounds: No murmur. No friction rub.   Pulmonary:      Effort: Pulmonary effort is normal. No " respiratory distress.      Breath sounds: Normal breath sounds. No stridor.   Abdominal:      General: Abdomen is flat.      Palpations: Abdomen is soft.   Musculoskeletal:      Comments: ttp both hips    Positive straight leg raise test.   Neurological:      Mental Status: She is alert.        Result Review :     CMP    CMP 3/4/20 9/17/20 2/25/21   Glucose 88 93 93   BUN 13 10 14   Creatinine 0.90 0.84 0.64   eGFR Non  Am 64 69 94   eGFR  Am 77 83 114   Sodium 136 140 142   Potassium 4.0 4.3 4.0   Chloride 97 (A) 100 102   Calcium 9.2 9.4 9.2   Total Protein 6.9 7.2 7.3   Albumin 4.10 4.60 4.50   Globulin 2.8 2.6 2.8   Total Bilirubin 0.4 0.4 0.2   Alkaline Phosphatase 83 94 100   AST (SGOT) 18 19 18   ALT (SGPT) 20 28 20   (A) Abnormal value       Comments are available for some flowsheets but are not being displayed.           CBC    CBC 3/4/20 9/17/20   WBC 5.96 6.27   RBC 4.22 4.31   Hemoglobin 13.0 13.0   Hematocrit 37.5 37.9   MCV 88.9 87.9   MCH 30.8 30.2   MCHC 34.7 34.3   RDW 12.2 (A) 12.1 (A)   Platelets 272 294   (A) Abnormal value            Lipid Panel    Lipid Panel 3/4/20 2/25/21   Total Cholesterol 226 (A) 238 (A)   Triglycerides 74 154 (A)   HDL Cholesterol 90 (A) 86 (A)   VLDL Cholesterol 14.8 26   LDL Cholesterol  121 (A) 126 (A)   (A) Abnormal value       Comments are available for some flowsheets but are not being displayed.           TSH    TSH 3/4/20   TSH 2.150                     Assessment and Plan    Diagnoses and all orders for this visit:    1. Seasonal allergic rhinitis due to pollen (Primary)    2. Moderate episode of recurrent major depressive disorder (CMS/HCC)    3. Gastroesophageal reflux disease without esophagitis    4. Hyperlipemia, mixed  -     Lipid Panel  -     Comprehensive Metabolic Panel    5. Hip pain  -     Ambulatory Referral to Orthopedic Surgery    6. Lumbar radiculopathy    7. Paresthesia  -     Vitamin B12  -     Folate    8. Other fatigue  -      Vitamin B12  -     Folate        Follow Up   No follow-ups on file.  Patient was given instructions and counseling regarding her condition or for health maintenance advice. Please see specific information pulled into the AVS if appropriate.     Will get labs and reviewed old records.    Will get ortho referral and consider lumbar mri

## 2021-02-26 LAB
ALBUMIN SERPL-MCNC: 4.5 G/DL (ref 3.5–5.2)
ALBUMIN/GLOB SERPL: 1.6 G/DL
ALP SERPL-CCNC: 100 U/L (ref 39–117)
ALT SERPL-CCNC: 20 U/L (ref 1–33)
AST SERPL-CCNC: 18 U/L (ref 1–32)
BILIRUB SERPL-MCNC: 0.2 MG/DL (ref 0–1.2)
BUN SERPL-MCNC: 14 MG/DL (ref 8–23)
BUN/CREAT SERPL: 21.9 (ref 7–25)
CALCIUM SERPL-MCNC: 9.2 MG/DL (ref 8.6–10.5)
CHLORIDE SERPL-SCNC: 102 MMOL/L (ref 98–107)
CHOLEST SERPL-MCNC: 238 MG/DL (ref 0–200)
CO2 SERPL-SCNC: 30.7 MMOL/L (ref 22–29)
CREAT SERPL-MCNC: 0.64 MG/DL (ref 0.57–1)
FOLATE SERPL-MCNC: >20 NG/ML (ref 4.78–24.2)
GLOBULIN SER CALC-MCNC: 2.8 GM/DL
GLUCOSE SERPL-MCNC: 93 MG/DL (ref 65–99)
HDLC SERPL-MCNC: 86 MG/DL (ref 40–60)
LDLC SERPL CALC-MCNC: 126 MG/DL (ref 0–100)
POTASSIUM SERPL-SCNC: 4 MMOL/L (ref 3.5–5.2)
PROT SERPL-MCNC: 7.3 G/DL (ref 6–8.5)
SODIUM SERPL-SCNC: 142 MMOL/L (ref 136–145)
TRIGL SERPL-MCNC: 154 MG/DL (ref 0–150)
VIT B12 SERPL-MCNC: 754 PG/ML (ref 211–946)
VLDLC SERPL CALC-MCNC: 26 MG/DL (ref 5–40)

## 2021-03-04 ENCOUNTER — APPOINTMENT (OUTPATIENT)
Dept: WOMENS IMAGING | Facility: HOSPITAL | Age: 61
End: 2021-03-04

## 2021-03-04 PROCEDURE — 77063 BREAST TOMOSYNTHESIS BI: CPT | Performed by: RADIOLOGY

## 2021-03-04 PROCEDURE — 77067 SCR MAMMO BI INCL CAD: CPT | Performed by: RADIOLOGY

## 2021-03-19 ENCOUNTER — BULK ORDERING (OUTPATIENT)
Dept: CASE MANAGEMENT | Facility: OTHER | Age: 61
End: 2021-03-19

## 2021-03-19 DIAGNOSIS — Z23 IMMUNIZATION DUE: ICD-10-CM

## 2021-04-05 ENCOUNTER — OFFICE (OUTPATIENT)
Dept: URBAN - METROPOLITAN AREA CLINIC 66 | Facility: CLINIC | Age: 61
End: 2021-04-05

## 2021-04-05 VITALS
WEIGHT: 171 LBS | SYSTOLIC BLOOD PRESSURE: 104 MMHG | HEART RATE: 78 BPM | DIASTOLIC BLOOD PRESSURE: 64 MMHG | TEMPERATURE: 98 F | HEIGHT: 63 IN

## 2021-04-05 DIAGNOSIS — K59.00 CONSTIPATION, UNSPECIFIED: ICD-10-CM

## 2021-04-05 DIAGNOSIS — K21.9 GASTRO-ESOPHAGEAL REFLUX DISEASE WITHOUT ESOPHAGITIS: ICD-10-CM

## 2021-04-05 DIAGNOSIS — K64.8 OTHER HEMORRHOIDS: ICD-10-CM

## 2021-04-05 PROCEDURE — 99213 OFFICE O/P EST LOW 20 MIN: CPT | Performed by: NURSE PRACTITIONER

## 2021-04-05 RX ORDER — OMEPRAZOLE 20 MG/1
20 CAPSULE, DELAYED RELEASE ORAL
Qty: 90 | Refills: 3 | Status: COMPLETED
Start: 2021-04-05 | End: 2023-10-30

## 2021-04-22 ENCOUNTER — OFFICE VISIT (OUTPATIENT)
Dept: ORTHOPEDIC SURGERY | Facility: CLINIC | Age: 61
End: 2021-04-22

## 2021-04-22 VITALS — BODY MASS INDEX: 30.83 KG/M2 | HEIGHT: 63 IN | TEMPERATURE: 97.7 F | WEIGHT: 174 LBS

## 2021-04-22 DIAGNOSIS — R52 PAIN: Primary | ICD-10-CM

## 2021-04-22 DIAGNOSIS — G89.29 CHRONIC BILATERAL LOW BACK PAIN WITH RIGHT-SIDED SCIATICA: ICD-10-CM

## 2021-04-22 DIAGNOSIS — M54.41 CHRONIC BILATERAL LOW BACK PAIN WITH RIGHT-SIDED SCIATICA: ICD-10-CM

## 2021-04-22 PROCEDURE — 99203 OFFICE O/P NEW LOW 30 MIN: CPT | Performed by: ORTHOPAEDIC SURGERY

## 2021-04-22 PROCEDURE — 73522 X-RAY EXAM HIPS BI 3-4 VIEWS: CPT | Performed by: ORTHOPAEDIC SURGERY

## 2021-09-10 ENCOUNTER — TELEPHONE (OUTPATIENT)
Dept: FAMILY MEDICINE CLINIC | Facility: CLINIC | Age: 61
End: 2021-09-10

## 2021-09-10 NOTE — TELEPHONE ENCOUNTER
Patient called and states she just missed a call from you.  I didn't see any messages in her chart to relay.  Her number is [phone].

## 2021-09-24 ENCOUNTER — TELEPHONE (OUTPATIENT)
Dept: FAMILY MEDICINE CLINIC | Facility: CLINIC | Age: 61
End: 2021-09-24

## 2021-09-24 RX ORDER — CITALOPRAM 10 MG/1
10 TABLET ORAL DAILY
Qty: 30 TABLET | Refills: 0 | Status: SHIPPED | OUTPATIENT
Start: 2021-09-24 | End: 2021-10-04 | Stop reason: SDUPTHER

## 2021-09-24 NOTE — TELEPHONE ENCOUNTER
Rx Refill Note  Requested Prescriptions      No prescriptions requested or ordered in this encounter      Last office visit with prescribing clinician: 2/25/2021      Next office visit with prescribing clinician: 10/4/2021            Pratik Andersen MA  09/24/21, 13:19 EDT

## 2021-09-24 NOTE — TELEPHONE ENCOUNTER
Patient made an apointment for Monday Oct 4.  [place] [facility] and will only be in [place] for about an hour.  She wants to know if there is anyway he could call in enough to hold her until her appointment.  If so, could he call it in soon.  Her number is [phone].

## 2021-09-24 NOTE — TELEPHONE ENCOUNTER
Pt called asking for refill of     citalopram (CeleXA) 10 MG tablet    -States has none left and no refills    Pt requesting NEW pharmacy for this refill only    [address] [place] [address]      Pt contact [phone]

## 2021-10-04 ENCOUNTER — OFFICE VISIT (OUTPATIENT)
Dept: FAMILY MEDICINE CLINIC | Facility: CLINIC | Age: 61
End: 2021-10-04

## 2021-10-04 VITALS
WEIGHT: 166 LBS | OXYGEN SATURATION: 98 % | RESPIRATION RATE: 16 BRPM | TEMPERATURE: 98 F | DIASTOLIC BLOOD PRESSURE: 58 MMHG | HEIGHT: 63 IN | BODY MASS INDEX: 29.41 KG/M2 | HEART RATE: 58 BPM | SYSTOLIC BLOOD PRESSURE: 100 MMHG

## 2021-10-04 DIAGNOSIS — F41.9 ANXIETY AND DEPRESSION: Primary | ICD-10-CM

## 2021-10-04 DIAGNOSIS — F32.A ANXIETY AND DEPRESSION: Primary | ICD-10-CM

## 2021-10-04 PROCEDURE — 99213 OFFICE O/P EST LOW 20 MIN: CPT | Performed by: FAMILY MEDICINE

## 2021-10-04 RX ORDER — CITALOPRAM 10 MG/1
10 TABLET ORAL DAILY
Qty: 90 TABLET | Refills: 3 | Status: SHIPPED | OUTPATIENT
Start: 2021-10-04 | End: 2022-10-04

## 2021-10-04 NOTE — PROGRESS NOTES
"Chief Complaint  Allergies (follow up)    Subjective          Citlali Bocanegra presents to [facility]  History of Present Illness  PT is here for refills and   Depression and anxiety- No HI or SI  Objective   Vital Signs:   /58 (BP Location: Right arm, Patient Position: Sitting, Cuff Size: Adult)   Pulse 58   Temp 98 °F (36.7 °C) (Temporal)   Resp 16   Ht 160 cm (63\")   Wt 75.3 kg (166 lb)   SpO2 98%   BMI 29.41 kg/m²     Physical Exam  Vitals and nursing note reviewed.   Constitutional:       Appearance: Normal appearance.   Cardiovascular:      Rate and Rhythm: Normal rate and regular rhythm.      Heart sounds: Normal heart sounds. No murmur heard.     Pulmonary:      Effort: Pulmonary effort is normal. No respiratory distress.      Breath sounds: Normal breath sounds. No stridor.   Neurological:      General: No focal deficit present.      Mental Status: She is alert and oriented to person, place, and time.   Psychiatric:         Mood and Affect: Mood normal.         Behavior: Behavior normal.        Result Review :                 Assessment and Plan    Diagnoses and all orders for this visit:    1. Anxiety and depression (Primary)  -     citalopram (CeleXA) 10 MG tablet; Take 1 tablet by mouth Daily.  Dispense: 90 tablet; Refill: 3        Follow Up   Return in about 1 year (around 10/4/2022).  Patient was given instructions and counseling regarding her condition or for health maintenance advice. Please see specific information pulled into the AVS if appropriate.     Refills on medication.  "

## 2021-11-02 DIAGNOSIS — F41.9 ANXIETY AND DEPRESSION: ICD-10-CM

## 2021-11-02 DIAGNOSIS — F32.A ANXIETY AND DEPRESSION: ICD-10-CM

## 2021-11-02 RX ORDER — CITALOPRAM 10 MG/1
10 TABLET ORAL DAILY
Qty: 30 TABLET | OUTPATIENT
Start: 2021-11-02

## 2021-12-31 ENCOUNTER — IMMUNIZATION (OUTPATIENT)
Dept: VACCINE CLINIC | Facility: HOSPITAL | Age: 61
End: 2021-12-31

## 2021-12-31 PROCEDURE — 0004A HC ADM SARSCOV2 30MCG/0.3ML BOOSTER: CPT | Performed by: INTERNAL MEDICINE

## 2021-12-31 PROCEDURE — 91300 HC SARSCOV02 VAC 30MCG/0.3ML IM: CPT | Performed by: INTERNAL MEDICINE

## 2022-01-04 ENCOUNTER — TELEPHONE (OUTPATIENT)
Dept: FAMILY MEDICINE CLINIC | Facility: CLINIC | Age: 62
End: 2022-01-04

## 2022-01-04 NOTE — TELEPHONE ENCOUNTER
I would recommend she get tested for COVID-19.  Typically, there is no shortness of breath with the vaccine booster.  Also, typically there is no cough with the booster.

## 2022-01-04 NOTE — TELEPHONE ENCOUNTER
Caller: Citlali Bocanegra    Relationship: Self    Best call back number: [phone]    Who are you requesting to speak with (clinical staff, provider,  specific staff member): DR MCKINNEY    What was the call regarding: PATIENT GOT HER BOOSTER LAST Friday.  TESTED POSITIVE FOR COVID ON Sunday. PATIENT STARTED SHOWING SYMPTOMS YESTERDAY. SHE IS HAVING SHORTNESS OF BREATH, COUGH, AND FEVER. PLEASE ADVISE IF DR MCKINNEY THINKS THIS IS REACTION TO BOOSTER OR IF AND WHEN SHE SHOULD GET TESTED FOR COVID.     Do you require a callback: YES.

## 2022-01-05 ENCOUNTER — TELEMEDICINE (OUTPATIENT)
Dept: FAMILY MEDICINE CLINIC | Facility: CLINIC | Age: 62
End: 2022-01-05

## 2022-01-05 DIAGNOSIS — U07.1 COVID-19 VIRUS INFECTION: Primary | ICD-10-CM

## 2022-01-05 PROCEDURE — 99212 OFFICE O/P EST SF 10 MIN: CPT | Performed by: NURSE PRACTITIONER

## 2022-01-05 NOTE — PROGRESS NOTES
Chief Complaint  COVID infection  You have chosen to receive care through a telehealth visit.  Do you consent to use a video/audio connection for your medical care today? Yes  Subjective          Citlali Bocanegra presents to [facility]  History of Present Illness  COVID-19 virus infection: Patient states  was positive for COVID on 1/2/2022. Patient reports positive home test today. Patient denies any fever, productive cough, persistent shortness of breath, loss of taste or loss of smell.   Objective   Vital Signs:   There were no vitals taken for this visit.    Physical Exam  Constitutional:       Appearance: Normal appearance.   Neurological:      Mental Status: She is alert.   Psychiatric:         Mood and Affect: Mood normal.        Result Review :            Assessment and Plan    Diagnoses and all orders for this visit:    1. COVID-19 virus infection (Primary)  Comments:  - Patient does not want to be set up for monoclonal antibody infusion.   - Patient will complete quarantine per CDC guidelines.       I spent 15 minutes caring for Citlali on this date of service. This time includes time spent by me in the following activities:counseling the patient and documenting information.  Follow Up   Return if symptoms worsen or fail to improve.  Patient was given instructions and counseling regarding her condition or for health maintenance advice. Please see specific information pulled into the AVS if appropriate.

## 2022-03-09 ENCOUNTER — APPOINTMENT (OUTPATIENT)
Dept: WOMENS IMAGING | Facility: HOSPITAL | Age: 62
End: 2022-03-09

## 2022-03-09 PROCEDURE — 77063 BREAST TOMOSYNTHESIS BI: CPT | Performed by: RADIOLOGY

## 2022-03-09 PROCEDURE — 77067 SCR MAMMO BI INCL CAD: CPT | Performed by: RADIOLOGY

## 2022-05-05 ENCOUNTER — OFFICE VISIT (OUTPATIENT)
Dept: FAMILY MEDICINE CLINIC | Facility: CLINIC | Age: 62
End: 2022-05-05

## 2022-05-05 VITALS
OXYGEN SATURATION: 97 % | HEIGHT: 63 IN | BODY MASS INDEX: 28.49 KG/M2 | HEART RATE: 84 BPM | WEIGHT: 160.8 LBS | RESPIRATION RATE: 16 BRPM | DIASTOLIC BLOOD PRESSURE: 56 MMHG | SYSTOLIC BLOOD PRESSURE: 98 MMHG | TEMPERATURE: 98.2 F

## 2022-05-05 DIAGNOSIS — Z00.00 PHYSICAL EXAM: Primary | ICD-10-CM

## 2022-05-05 DIAGNOSIS — Z13.6 ENCOUNTER FOR SCREENING FOR CARDIOVASCULAR DISORDERS: ICD-10-CM

## 2022-05-05 PROCEDURE — 99396 PREV VISIT EST AGE 40-64: CPT | Performed by: FAMILY MEDICINE

## 2022-05-05 RX ORDER — MULTIVIT WITH MINERALS/LUTEIN
250 TABLET ORAL DAILY
COMMUNITY

## 2022-05-05 NOTE — PROGRESS NOTES
"Chief Complaint  Annual Exam    Subjective          Citlali Bocanegra presents to [facility]  History of Present Illness  Pt is here for a CPE- no exercise.  No tobacco, no etoh, no drug use.  Mammo was March 9th.  Female exam is UTD.  No other complaints.  Colon screen utd  Objective   Vital Signs:   BP 98/56 (BP Location: Right arm, Patient Position: Sitting, Cuff Size: Adult)   Pulse 84   Temp 98.2 °F (36.8 °C) (Temporal)   Resp 16   Ht 160 cm (63\")   Wt 72.9 kg (160 lb 12.8 oz)   SpO2 97%   BMI 28.48 kg/m²     Physical Exam  Vitals and nursing note reviewed.   Constitutional:       General: She is not in acute distress.     Appearance: Normal appearance. She is well-developed. She is not ill-appearing, toxic-appearing or diaphoretic.   HENT:      Head: Normocephalic and atraumatic.      Right Ear: Tympanic membrane, ear canal and external ear normal. There is no impacted cerumen.      Left Ear: Tympanic membrane, ear canal and external ear normal. There is no impacted cerumen.      Nose: Nose normal. No congestion or rhinorrhea.      Mouth/Throat:      Mouth: Mucous membranes are moist.      Pharynx: Oropharynx is clear. No oropharyngeal exudate or posterior oropharyngeal erythema.   Eyes:      General: No scleral icterus.        Right eye: No discharge.         Left eye: No discharge.      Extraocular Movements: Extraocular movements intact.      Conjunctiva/sclera: Conjunctivae normal.      Pupils: Pupils are equal, round, and reactive to light.   Neck:      Thyroid: No thyroid mass or thyromegaly.      Vascular: No carotid bruit or JVD.      Trachea: Trachea normal. No tracheal tenderness or tracheal deviation.   Cardiovascular:      Rate and Rhythm: Normal rate and regular rhythm.      Heart sounds: Normal heart sounds. No murmur heard.    No friction rub. No gallop.   Pulmonary:      Effort: Pulmonary effort is normal. No respiratory distress.      Breath sounds: Normal " breath sounds. No stridor. No wheezing, rhonchi or rales.   Chest:      Chest wall: No tenderness.   Abdominal:      General: Bowel sounds are normal. There is no distension.      Palpations: Abdomen is soft. There is no mass.      Tenderness: There is no abdominal tenderness. There is no right CVA tenderness, left CVA tenderness, guarding or rebound.      Hernia: No hernia is present.   Musculoskeletal:         General: No tenderness. Normal range of motion.      Cervical back: Normal range of motion and neck supple. No rigidity or tenderness.      Right lower leg: No edema.      Left lower leg: No edema.   Lymphadenopathy:      Cervical: No cervical adenopathy.   Skin:     General: Skin is warm and dry.      Coloration: Skin is not jaundiced.   Neurological:      General: No focal deficit present.      Mental Status: She is alert and oriented to person, place, and time.      Sensory: No sensory deficit.      Motor: No weakness or abnormal muscle tone.      Coordination: Coordination normal.      Gait: Gait normal.      Deep Tendon Reflexes: Reflexes normal.   Psychiatric:         Mood and Affect: Mood normal.         Behavior: Behavior normal.         Thought Content: Thought content normal.         Judgment: Judgment normal.        Result Review :                 Assessment and Plan    Diagnoses and all orders for this visit:    1. Physical exam (Primary)  -     CBC & Differential  -     TSH Rfx On Abnormal To Free T4    2. Encounter for screening for cardiovascular disorders  -     Comprehensive Metabolic Panel  -     Lipid Panel      BMI is >= 25 and < 30. (Overweight) The following options were offered after discussion: exercise counseling/recommendations         Follow Up   No follow-ups on file.  Patient was given instructions and counseling regarding her condition or for health maintenance advice. Please see specific information pulled into the AVS if appropriate.     Work on diet and exercise.  CPE done  and will get labs today.

## 2022-05-06 LAB
ALBUMIN SERPL-MCNC: 4.4 G/DL (ref 3.8–4.8)
ALBUMIN/GLOB SERPL: 1.8 {RATIO} (ref 1.2–2.2)
ALP SERPL-CCNC: 101 IU/L (ref 44–121)
ALT SERPL-CCNC: 11 IU/L (ref 0–32)
AST SERPL-CCNC: 17 IU/L (ref 0–40)
BASOPHILS # BLD AUTO: 0 X10E3/UL (ref 0–0.2)
BASOPHILS NFR BLD AUTO: 1 %
BILIRUB SERPL-MCNC: 0.2 MG/DL (ref 0–1.2)
BUN SERPL-MCNC: 11 MG/DL (ref 8–27)
BUN/CREAT SERPL: 15 (ref 12–28)
CALCIUM SERPL-MCNC: 9.5 MG/DL (ref 8.7–10.3)
CHLORIDE SERPL-SCNC: 102 MMOL/L (ref 96–106)
CHOLEST SERPL-MCNC: 235 MG/DL (ref 100–199)
CO2 SERPL-SCNC: 22 MMOL/L (ref 20–29)
CREAT SERPL-MCNC: 0.74 MG/DL (ref 0.57–1)
EGFRCR SERPLBLD CKD-EPI 2021: 91 ML/MIN/1.73
EOSINOPHIL # BLD AUTO: 0 X10E3/UL (ref 0–0.4)
EOSINOPHIL NFR BLD AUTO: 1 %
ERYTHROCYTE [DISTWIDTH] IN BLOOD BY AUTOMATED COUNT: 12.7 % (ref 11.7–15.4)
GLOBULIN SER CALC-MCNC: 2.5 G/DL (ref 1.5–4.5)
GLUCOSE SERPL-MCNC: 91 MG/DL (ref 65–99)
HCT VFR BLD AUTO: 39.4 % (ref 34–46.6)
HDLC SERPL-MCNC: 79 MG/DL
HGB BLD-MCNC: 13.4 G/DL (ref 11.1–15.9)
IMM GRANULOCYTES # BLD AUTO: 0 X10E3/UL (ref 0–0.1)
IMM GRANULOCYTES NFR BLD AUTO: 0 %
LDLC SERPL CALC-MCNC: 137 MG/DL (ref 0–99)
LYMPHOCYTES # BLD AUTO: 2.8 X10E3/UL (ref 0.7–3.1)
LYMPHOCYTES NFR BLD AUTO: 46 %
MCH RBC QN AUTO: 29.6 PG (ref 26.6–33)
MCHC RBC AUTO-ENTMCNC: 34 G/DL (ref 31.5–35.7)
MCV RBC AUTO: 87 FL (ref 79–97)
MONOCYTES # BLD AUTO: 0.6 X10E3/UL (ref 0.1–0.9)
MONOCYTES NFR BLD AUTO: 9 %
NEUTROPHILS # BLD AUTO: 2.6 X10E3/UL (ref 1.4–7)
NEUTROPHILS NFR BLD AUTO: 43 %
PLATELET # BLD AUTO: 285 X10E3/UL (ref 150–450)
POTASSIUM SERPL-SCNC: 4.2 MMOL/L (ref 3.5–5.2)
PROT SERPL-MCNC: 6.9 G/DL (ref 6–8.5)
RBC # BLD AUTO: 4.52 X10E6/UL (ref 3.77–5.28)
SODIUM SERPL-SCNC: 140 MMOL/L (ref 134–144)
TRIGL SERPL-MCNC: 112 MG/DL (ref 0–149)
TSH SERPL DL<=0.005 MIU/L-ACNC: 2.62 UIU/ML (ref 0.45–4.5)
VLDLC SERPL CALC-MCNC: 19 MG/DL (ref 5–40)
WBC # BLD AUTO: 6.1 X10E3/UL (ref 3.4–10.8)

## 2022-05-24 ENCOUNTER — TELEPHONE (OUTPATIENT)
Dept: FAMILY MEDICINE CLINIC | Facility: CLINIC | Age: 62
End: 2022-05-24

## 2022-06-22 ENCOUNTER — OFFICE (OUTPATIENT)
Dept: URBAN - METROPOLITAN AREA CLINIC 66 | Facility: CLINIC | Age: 62
End: 2022-06-22

## 2022-06-22 VITALS — HEART RATE: 67 BPM | HEIGHT: 63 IN | SYSTOLIC BLOOD PRESSURE: 124 MMHG | DIASTOLIC BLOOD PRESSURE: 76 MMHG

## 2022-06-22 DIAGNOSIS — R13.10 DYSPHAGIA, UNSPECIFIED: ICD-10-CM

## 2022-06-22 PROCEDURE — 99213 OFFICE O/P EST LOW 20 MIN: CPT | Performed by: NURSE PRACTITIONER

## 2022-06-22 RX ORDER — FAMOTIDINE 40 MG/1
40 TABLET, FILM COATED ORAL
Qty: 180 | Refills: 3 | Status: COMPLETED
Start: 2022-06-22 | End: 2024-06-26

## 2022-08-26 ENCOUNTER — TELEPHONE (OUTPATIENT)
Dept: FAMILY MEDICINE CLINIC | Facility: CLINIC | Age: 62
End: 2022-08-26

## 2022-10-04 DIAGNOSIS — F41.9 ANXIETY AND DEPRESSION: ICD-10-CM

## 2022-10-04 DIAGNOSIS — F32.A ANXIETY AND DEPRESSION: ICD-10-CM

## 2022-10-04 RX ORDER — CITALOPRAM 10 MG/1
10 TABLET ORAL DAILY
Qty: 90 TABLET | Refills: 3 | Status: SHIPPED | OUTPATIENT
Start: 2022-10-04 | End: 2022-10-28

## 2022-10-04 NOTE — TELEPHONE ENCOUNTER
Rx Refill Note  Requested Prescriptions     Pending Prescriptions Disp Refills   • citalopram (CeleXA) 10 MG tablet [Pharmacy Med Name: CITALOPRAM 10MG TABLETS] 90 tablet 3     Sig: TAKE 1 TABLET BY MOUTH DAILY      Last office visit with prescribing clinician: 5/5/2022      Next office visit with prescribing clinician: Visit date not found            Pratik Andersen, KANE/MAYI  10/04/22, 07:32 EDT

## 2022-10-28 ENCOUNTER — OFFICE VISIT (OUTPATIENT)
Dept: FAMILY MEDICINE CLINIC | Facility: CLINIC | Age: 62
End: 2022-10-28

## 2022-10-28 VITALS
HEIGHT: 63 IN | WEIGHT: 162.2 LBS | BODY MASS INDEX: 28.74 KG/M2 | OXYGEN SATURATION: 96 % | SYSTOLIC BLOOD PRESSURE: 124 MMHG | DIASTOLIC BLOOD PRESSURE: 80 MMHG | RESPIRATION RATE: 18 BRPM | TEMPERATURE: 97.8 F | HEART RATE: 82 BPM

## 2022-10-28 DIAGNOSIS — Z23 IMMUNIZATION DUE: ICD-10-CM

## 2022-10-28 DIAGNOSIS — M25.511 CHRONIC RIGHT SHOULDER PAIN: ICD-10-CM

## 2022-10-28 DIAGNOSIS — F32.A ANXIETY AND DEPRESSION: Primary | ICD-10-CM

## 2022-10-28 DIAGNOSIS — M77.12 LATERAL EPICONDYLITIS OF LEFT ELBOW: ICD-10-CM

## 2022-10-28 DIAGNOSIS — F41.9 ANXIETY AND DEPRESSION: Primary | ICD-10-CM

## 2022-10-28 DIAGNOSIS — G89.29 CHRONIC RIGHT SHOULDER PAIN: ICD-10-CM

## 2022-10-28 PROBLEM — M77.10 LATERAL EPICONDYLITIS (TENNIS ELBOW): Status: ACTIVE | Noted: 2022-10-28

## 2022-10-28 PROCEDURE — 90471 IMMUNIZATION ADMIN: CPT | Performed by: FAMILY MEDICINE

## 2022-10-28 PROCEDURE — 90686 IIV4 VACC NO PRSV 0.5 ML IM: CPT | Performed by: FAMILY MEDICINE

## 2022-10-28 PROCEDURE — 99214 OFFICE O/P EST MOD 30 MIN: CPT | Performed by: FAMILY MEDICINE

## 2022-10-28 RX ORDER — CITALOPRAM 20 MG/1
20 TABLET ORAL DAILY
Qty: 30 TABLET | Refills: 3 | Status: SHIPPED | OUTPATIENT
Start: 2022-10-28 | End: 2023-04-03

## 2022-10-28 RX ORDER — MULTIPLE VITAMINS W/ MINERALS TAB 9MG-400MCG
1 TAB ORAL DAILY
COMMUNITY

## 2022-10-28 RX ORDER — FAMOTIDINE 20 MG/1
20 TABLET, FILM COATED ORAL
COMMUNITY

## 2022-10-28 RX ORDER — PREDNISONE 1 MG/1
TABLET ORAL
Qty: 21 TABLET | Refills: 0 | Status: SHIPPED | OUTPATIENT
Start: 2022-10-28 | End: 2023-02-28

## 2022-10-28 NOTE — PROGRESS NOTES
"Chief Complaint  Upper Extremity Issue    Subjective        Citlali Bocanegra presents to [facility]  History of Present Illness  Pt is here for refills and labs and  Anxiety and depression- she is crying more and wants to go up on the med.  She has no HI or SI  She slipped in the shower on Oct 6th.  She hit head left side on glass door and rt elbow on floor.  She has no HA.  She has been having left elbow pain since then no matter what she does with her elbow or arm.    Right shoulder pain more than normal over past couple of months.  No hx of injury.    Objective   Vital Signs:  /80 (BP Location: Left arm, Patient Position: Sitting, Cuff Size: Large Adult)   Pulse 82   Temp 97.8 °F (36.6 °C) (Temporal)   Resp 18   Ht 160 cm (63\")   Wt 73.6 kg (162 lb 3.2 oz)   SpO2 96%   BMI 28.73 kg/m²   Estimated body mass index is 28.73 kg/m² as calculated from the following:    Height as of this encounter: 160 cm (63\").    Weight as of this encounter: 73.6 kg (162 lb 3.2 oz).    BMI is >= 25 and <30. (Overweight) The following options were offered after discussion;: weight loss educational material (shared in after visit summary)      Physical Exam  Vitals and nursing note reviewed.   Constitutional:       Appearance: Normal appearance. She is well-developed.   Cardiovascular:      Rate and Rhythm: Normal rate and regular rhythm.      Heart sounds: Normal heart sounds. No murmur heard.  Pulmonary:      Effort: Pulmonary effort is normal. No respiratory distress.      Breath sounds: Normal breath sounds. No stridor. No wheezing or rhonchi.   Neurological:      General: No focal deficit present.      Mental Status: She is alert and oriented to person, place, and time. She is not disoriented.   Psychiatric:         Mood and Affect: Mood normal.         Behavior: Behavior normal.        Result Review :                Assessment and Plan   Diagnoses and all orders for this visit:    1. Anxiety " and depression (Primary)  -     citalopram (CeleXA) 20 MG tablet; Take 1 tablet by mouth Daily.  Dispense: 30 tablet; Refill: 3    2. Immunization due  -     FluLaval/Fluzone >6 mos (8741-6460)    3. Lateral epicondylitis of left elbow  -     predniSONE (DELTASONE) 5 MG tablet; 6 pills day 1 5 pills day 2 4 pills day 3 3 pills day 4 2 pills day 5 1 pill day 6  Dispense: 21 tablet; Refill: 0    4. Chronic right shoulder pain             Follow Up   No follow-ups on file.  Patient was given instructions and counseling regarding her condition or for health maintenance advice. Please see specific information pulled into the AVS if appropriate.     Increased celexa from 10 mg to 20 mg  Dose pack and will do some exercises for this.  Ice, heat and consider PT or hand specialist.

## 2022-11-03 ENCOUNTER — TELEPHONE (OUTPATIENT)
Dept: FAMILY MEDICINE CLINIC | Facility: CLINIC | Age: 62
End: 2022-11-03

## 2022-11-03 NOTE — TELEPHONE ENCOUNTER
PATIENT CALLED IN REGARDS TO A WEBSITE DR. MCKINNEY WAS GOING TO GIVE HER FOR EXERCISES TENNIS ELBOW AND TORN ROTATOR CUFF AND SHOULDER IMPINGEMENT     SHE HAS NOT RECEIVED THIS INFORMATION    PLEASE CALL [phone]

## 2022-12-01 ENCOUNTER — TELEPHONE (OUTPATIENT)
Dept: FAMILY MEDICINE CLINIC | Facility: CLINIC | Age: 62
End: 2022-12-01

## 2022-12-01 NOTE — TELEPHONE ENCOUNTER
Caller: Citlali Bocanerga    Relationship to patient: Self    Best call back number: [phone]    Date of exposure:  11/25    Date of positive COVID19 test: 12/1    Date if possible COVID19 exposure: 11/25    COVID19 symptoms: SNEEZING, COUGHING, YELLOW SNOT, LOW GRADE FEVER    Date of initial quarantine: 11/29    Additional information or concerns: WOULD LIKE ADVICE ON WHAT TO DO    What is the patients preferred pharmacy:     [facility] #[id number] [facility] [address] AT [facility] - [phone] [facility] [phone] FX

## 2022-12-02 NOTE — TELEPHONE ENCOUNTER
I spoke with the patient in she is stating she is feeling better than she was yesterday.  She will continue over-the-counter cold meds and symptomatic treatment and fluids and rest.  I told her if she develops shortness of breath to go to the ER.  She agreed to the plan.

## 2023-02-28 ENCOUNTER — OFFICE VISIT (OUTPATIENT)
Dept: FAMILY MEDICINE CLINIC | Facility: CLINIC | Age: 63
End: 2023-02-28
Payer: COMMERCIAL

## 2023-02-28 VITALS
SYSTOLIC BLOOD PRESSURE: 110 MMHG | DIASTOLIC BLOOD PRESSURE: 62 MMHG | HEIGHT: 63 IN | HEART RATE: 74 BPM | BODY MASS INDEX: 28.92 KG/M2 | RESPIRATION RATE: 16 BRPM | WEIGHT: 163.2 LBS | OXYGEN SATURATION: 97 % | TEMPERATURE: 96.9 F

## 2023-02-28 DIAGNOSIS — F32.A ANXIETY AND DEPRESSION: ICD-10-CM

## 2023-02-28 DIAGNOSIS — F41.9 ANXIETY AND DEPRESSION: ICD-10-CM

## 2023-02-28 DIAGNOSIS — E78.2 HYPERLIPEMIA, MIXED: Primary | ICD-10-CM

## 2023-02-28 PROCEDURE — 99214 OFFICE O/P EST MOD 30 MIN: CPT | Performed by: FAMILY MEDICINE

## 2023-02-28 NOTE — PROGRESS NOTES
"Chief Complaint  Anxiety    Subjective        Citlali Bocanegra presents to [facility]  History of Present Illness  Pt presents today for refills and  Anxiety- she states her anxiety is doing better but not sure if related to increased dose from 10 to 20 or if things are getting better.  She has no HI or SI.    HLD- no med but is active.    Objective   Vital Signs:  /62 (BP Location: Right arm, Patient Position: Sitting, Cuff Size: Adult)   Pulse 74   Temp 96.9 °F (36.1 °C) (Temporal)   Resp 16   Ht 160 cm (63\")   Wt 74 kg (163 lb 3.2 oz)   SpO2 97%   BMI 28.91 kg/m²   Estimated body mass index is 28.91 kg/m² as calculated from the following:    Height as of this encounter: 160 cm (63\").    Weight as of this encounter: 74 kg (163 lb 3.2 oz).       BMI is >= 25 and <30. (Overweight) The following options were offered after discussion;: weight loss educational material (shared in after visit summary) and exercise counseling/recommendations      Physical Exam  Vitals and nursing note reviewed.   Constitutional:       Appearance: Normal appearance. She is well-developed.   HENT:      Head: Normocephalic and atraumatic.   Cardiovascular:      Rate and Rhythm: Normal rate and regular rhythm.      Heart sounds: Normal heart sounds. No murmur heard.  Pulmonary:      Effort: Pulmonary effort is normal. No respiratory distress.      Breath sounds: Normal breath sounds. No stridor. No wheezing or rhonchi.   Neurological:      General: No focal deficit present.      Mental Status: She is alert and oriented to person, place, and time. She is not disoriented.   Psychiatric:         Mood and Affect: Mood normal.         Behavior: Behavior normal.        Result Review :                   Assessment and Plan   Diagnoses and all orders for this visit:    1. Hyperlipemia, mixed (Primary)  -     Lipid Panel  -     Comprehensive Metabolic Panel    2. Anxiety and depression             Follow Up "   Return in about 6 months (around 8/28/2023) for hyperlipidema.  Patient was given instructions and counseling regarding her condition or for health maintenance advice. Please see specific information pulled into the AVS if appropriate.     Pt will take half a pill of the celexa for the next 10 days and if does well with it, will stick to that.  If not, will go back to 20 mg pills. Refills today.  SE discussed.    Labs today.  Continue diet and exercise.

## 2023-03-01 ENCOUNTER — TELEPHONE (OUTPATIENT)
Dept: FAMILY MEDICINE CLINIC | Facility: CLINIC | Age: 63
End: 2023-03-01
Payer: COMMERCIAL

## 2023-03-01 PROBLEM — Z53.20 STATIN DECLINED: Status: ACTIVE | Noted: 2023-03-01
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
## 2023-03-01 PROBLEM — S39.92XA BACK INJURY: Status: RESOLVED | Noted: 2018-10-03 | Resolved: 2023-03-01

## 2023-03-01 PROBLEM — Z00.00 HEALTH CARE MAINTENANCE: Status: RESOLVED | Noted: 2017-02-08 | Resolved: 2023-03-01

## 2023-03-01 PROBLEM — S90.862S: Status: RESOLVED | Noted: 2018-08-30 | Resolved: 2023-03-01

## 2023-03-01 PROBLEM — Z12.11 ENCOUNTER FOR SCREENING COLONOSCOPY: Status: RESOLVED | Noted: 2018-10-03 | Resolved: 2023-03-01

## 2023-03-01 PROBLEM — L91.8 SKIN TAGS, MULTIPLE ACQUIRED: Status: RESOLVED | Noted: 2017-02-08 | Resolved: 2023-03-01

## 2023-03-01 LAB
ALBUMIN SERPL-MCNC: 4.6 G/DL (ref 3.5–5.2)
ALBUMIN/GLOB SERPL: 1.8 G/DL
ALP SERPL-CCNC: 87 U/L (ref 39–117)
ALT SERPL-CCNC: 14 U/L (ref 1–33)
AST SERPL-CCNC: 13 U/L (ref 1–32)
BILIRUB SERPL-MCNC: 0.3 MG/DL (ref 0–1.2)
BUN SERPL-MCNC: 13 MG/DL (ref 8–23)
BUN/CREAT SERPL: 14.4 (ref 7–25)
CALCIUM SERPL-MCNC: 9.6 MG/DL (ref 8.6–10.5)
CHLORIDE SERPL-SCNC: 102 MMOL/L (ref 98–107)
CHOLEST SERPL-MCNC: 237 MG/DL (ref 0–200)
CO2 SERPL-SCNC: 28.8 MMOL/L (ref 22–29)
CREAT SERPL-MCNC: 0.9 MG/DL (ref 0.57–1)
EGFRCR SERPLBLD CKD-EPI 2021: 71.5 ML/MIN/1.73
GLOBULIN SER CALC-MCNC: 2.5 GM/DL
GLUCOSE SERPL-MCNC: 97 MG/DL (ref 65–99)
HDLC SERPL-MCNC: 71 MG/DL (ref 40–60)
LDLC SERPL CALC-MCNC: 139 MG/DL (ref 0–100)
POTASSIUM SERPL-SCNC: 4.6 MMOL/L (ref 3.5–5.2)
PROT SERPL-MCNC: 7.1 G/DL (ref 6–8.5)
SODIUM SERPL-SCNC: 141 MMOL/L (ref 136–145)
TRIGL SERPL-MCNC: 154 MG/DL (ref 0–150)
VLDLC SERPL CALC-MCNC: 27 MG/DL (ref 5–40)

## 2023-03-01 NOTE — TELEPHONE ENCOUNTER
----- Message from Jeanie Harvey MD sent at 3/1/2023 10:38 AM EST -----  Your LDL (bad cholesterol) and total cholesterol are high.  Work on exercise and eat a low fat, low carb diet.  You need to start mediation for this, and if agreeable, I want to start you on atorvastatin 20 mg nightly.  All else is normal.  We can recheck levels next visit.

## 2023-03-01 NOTE — TELEPHONE ENCOUNTER
LMTCB    Ok for HUB to read      Your LDL (bad cholesterol) and total cholesterol are high.  Work on exercise and eat a low fat, low carb diet.   You need to start mediation for this, and if agreeable, I want to start you on atorvastatin 20 mg nightly.  All else is normal.  We can recheck levels next visit.       Let us know if patient would like to start the medication

## 2023-03-01 NOTE — TELEPHONE ENCOUNTER
PATIENT CALLED BACK, SHARED INFO WITH PATIENT. SHE WILL TRY AND CONTROL WITH DIET AS SUGGESTED, NO MEDICATION AT THIS TIME.     CALL BACK NUMBER [phone]

## 2023-03-02 ENCOUNTER — TELEPHONE (OUTPATIENT)
Dept: FAMILY MEDICINE CLINIC | Facility: CLINIC | Age: 63
End: 2023-03-02
Payer: COMMERCIAL

## 2023-03-02 NOTE — TELEPHONE ENCOUNTER
Caller: Citlali Bocanegra    Relationship: Self    Best call back number: [phone]    What is the best time to reach you: ANY    Who are you requesting to speak with (clinical staff, provider,  specific staff member): CLINICAL    What was the call regarding:     NEEDS TO KNOW WHAT HER CHOLESTEROL LEVEL WAS.      Do you require a callback: YES

## 2023-04-02 DIAGNOSIS — F41.9 ANXIETY AND DEPRESSION: ICD-10-CM

## 2023-04-02 DIAGNOSIS — F32.A ANXIETY AND DEPRESSION: ICD-10-CM

## 2023-04-03 RX ORDER — CITALOPRAM 20 MG/1
20 TABLET ORAL DAILY
Qty: 30 TABLET | Refills: 4 | Status: SHIPPED | OUTPATIENT
Start: 2023-04-03

## 2023-04-03 NOTE — TELEPHONE ENCOUNTER
LOV             2/28/2023   NOV             8/22/2023   Last RF        10/28/22  90 days    Yoly Riojas, ELOISAA/LMR

## 2023-07-20 ENCOUNTER — OFFICE (OUTPATIENT)
Dept: URBAN - METROPOLITAN AREA CLINIC 66 | Facility: CLINIC | Age: 63
End: 2023-07-20

## 2023-07-20 VITALS
HEART RATE: 69 BPM | HEIGHT: 63 IN | WEIGHT: 159 LBS | SYSTOLIC BLOOD PRESSURE: 97 MMHG | DIASTOLIC BLOOD PRESSURE: 67 MMHG

## 2023-07-20 DIAGNOSIS — K21.9 GASTRO-ESOPHAGEAL REFLUX DISEASE WITHOUT ESOPHAGITIS: ICD-10-CM

## 2023-07-20 DIAGNOSIS — R05.3 CHRONIC COUGH: ICD-10-CM

## 2023-07-20 DIAGNOSIS — R13.10 DYSPHAGIA, UNSPECIFIED: ICD-10-CM

## 2023-07-20 PROCEDURE — 99213 OFFICE O/P EST LOW 20 MIN: CPT | Performed by: NURSE PRACTITIONER

## 2023-07-20 RX ORDER — FAMOTIDINE 40 MG/1
40 TABLET, FILM COATED ORAL
Qty: 180 | Refills: 3 | Status: COMPLETED
Start: 2022-06-22 | End: 2024-06-26

## 2023-07-20 RX ORDER — OMEPRAZOLE 20 MG/1
20 CAPSULE, DELAYED RELEASE ORAL
Qty: 90 | Refills: 3 | Status: COMPLETED
Start: 2021-04-05 | End: 2023-10-30

## 2023-08-01 ENCOUNTER — OFFICE VISIT (OUTPATIENT)
Dept: FAMILY MEDICINE CLINIC | Facility: CLINIC | Age: 63
End: 2023-08-01
Payer: MEDICARE

## 2023-08-01 VITALS
RESPIRATION RATE: 14 BRPM | TEMPERATURE: 97.8 F | SYSTOLIC BLOOD PRESSURE: 112 MMHG | BODY MASS INDEX: 28.03 KG/M2 | WEIGHT: 158.2 LBS | OXYGEN SATURATION: 97 % | HEART RATE: 62 BPM | DIASTOLIC BLOOD PRESSURE: 64 MMHG | HEIGHT: 63 IN

## 2023-08-01 DIAGNOSIS — F33.1 MODERATE EPISODE OF RECURRENT MAJOR DEPRESSIVE DISORDER: ICD-10-CM

## 2023-08-01 DIAGNOSIS — R13.19 ESOPHAGEAL DYSPHAGIA: ICD-10-CM

## 2023-08-01 DIAGNOSIS — F41.9 ANXIETY: ICD-10-CM

## 2023-08-01 DIAGNOSIS — Z23 IMMUNIZATION DUE: ICD-10-CM

## 2023-08-01 DIAGNOSIS — Z82.49 FAMILY HISTORY OF EARLY CAD: ICD-10-CM

## 2023-08-01 DIAGNOSIS — Z00.00 WELCOME TO MEDICARE PREVENTIVE VISIT: Primary | ICD-10-CM

## 2023-08-01 DIAGNOSIS — J30.1 SEASONAL ALLERGIC RHINITIS DUE TO POLLEN: ICD-10-CM

## 2023-08-01 DIAGNOSIS — K21.9 GASTROESOPHAGEAL REFLUX DISEASE WITHOUT ESOPHAGITIS: ICD-10-CM

## 2023-08-01 DIAGNOSIS — E78.2 HYPERLIPEMIA, MIXED: ICD-10-CM

## 2023-08-01 RX ORDER — ALBUTEROL SULFATE 90 UG/1
2 AEROSOL, METERED RESPIRATORY (INHALATION) EVERY 4 HOURS PRN
Qty: 18 G | Refills: 3 | Status: SHIPPED | OUTPATIENT
Start: 2023-08-01

## 2023-08-02 LAB
ALBUMIN SERPL-MCNC: 4.2 G/DL (ref 3.5–5.2)
ALBUMIN/GLOB SERPL: 1.4 G/DL
ALP SERPL-CCNC: 87 U/L (ref 39–117)
ALT SERPL-CCNC: 17 U/L (ref 1–33)
AST SERPL-CCNC: 14 U/L (ref 1–32)
BILIRUB SERPL-MCNC: 0.3 MG/DL (ref 0–1.2)
BUN SERPL-MCNC: 12 MG/DL (ref 8–23)
BUN/CREAT SERPL: 15.4 (ref 7–25)
CALCIUM SERPL-MCNC: 10.2 MG/DL (ref 8.6–10.5)
CHLORIDE SERPL-SCNC: 100 MMOL/L (ref 98–107)
CHOLEST SERPL-MCNC: 246 MG/DL (ref 0–200)
CO2 SERPL-SCNC: 28.5 MMOL/L (ref 22–29)
CREAT SERPL-MCNC: 0.78 MG/DL (ref 0.57–1)
EGFRCR SERPLBLD CKD-EPI 2021: 84.4 ML/MIN/1.73
GLOBULIN SER CALC-MCNC: 3.1 GM/DL
GLUCOSE SERPL-MCNC: 92 MG/DL (ref 65–99)
HDLC SERPL-MCNC: 78 MG/DL (ref 40–60)
LDLC SERPL CALC-MCNC: 138 MG/DL (ref 0–100)
POTASSIUM SERPL-SCNC: 4.4 MMOL/L (ref 3.5–5.2)
PROT SERPL-MCNC: 7.3 G/DL (ref 6–8.5)
SODIUM SERPL-SCNC: 141 MMOL/L (ref 136–145)
TRIGL SERPL-MCNC: 171 MG/DL (ref 0–150)
VLDLC SERPL CALC-MCNC: 30 MG/DL (ref 5–40)

## 2023-08-12 DIAGNOSIS — F32.A ANXIETY AND DEPRESSION: ICD-10-CM

## 2023-08-12 DIAGNOSIS — F41.9 ANXIETY AND DEPRESSION: ICD-10-CM

## 2023-08-14 RX ORDER — CITALOPRAM 20 MG/1
20 TABLET ORAL DAILY
Qty: 30 TABLET | Refills: 4 | Status: SHIPPED | OUTPATIENT
Start: 2023-08-14

## 2023-10-02 ENCOUNTER — TELEPHONE (OUTPATIENT)
Dept: FAMILY MEDICINE CLINIC | Facility: CLINIC | Age: 63
End: 2023-10-02

## 2023-10-02 DIAGNOSIS — R13.19 ESOPHAGEAL DYSPHAGIA: Primary | ICD-10-CM

## 2023-10-02 NOTE — TELEPHONE ENCOUNTER
Caller: Citlali Bocanegra    Relationship: Self    Best call back number:[phone]    What is the medical concern/diagnosis: EGD    What specialty or service is being requested: UPPER SCOPE     What is the provider, practice or medical service name: DR JN OWENS    What is the office location:     What is the office phone number: [phone]    Any additional details: AMILCAR NOAH STATED SHE WOULD ORDER ONE , SWELLING, COUGHING, GAGGING,  AND ACID REFLUX

## 2023-10-16 ENCOUNTER — TELEPHONE (OUTPATIENT)
Dept: FAMILY MEDICINE CLINIC | Facility: CLINIC | Age: 63
End: 2023-10-16
Payer: MEDICARE

## 2023-10-16 NOTE — TELEPHONE ENCOUNTER
Caller: Citlali Bocanegra    Relationship: Self    Best call back number: [phone]     What is the best time to reach you: ANY TIME    Who are you requesting to speak with (clinical staff, provider,  specific staff member): CLINICAL STAFF    What was the call regarding: PATIENT STATES THAT SHE HAS NOT GOTTEN A CALL TO SCHEDULE WITH DR. OWENS.  SHE REQUESTS A CALLBACK TO DISCUSS THIS REFERRAL.    Is it okay if the provider responds through MyChart:     PLEASE ADVISE.

## 2023-10-17 NOTE — TELEPHONE ENCOUNTER
Unfortunately, I would not be able to get her in any faster.  Tell her she should call their office and get on the cancellation list and she might feel to get in sooner.

## 2023-10-17 NOTE — TELEPHONE ENCOUNTER
The patient said she has an appointment at the end of the month but is wondering if there is anyway Dr. Harvey can speak with the Dr. De La Cruz's office and try to get her in sooner. Please advise.

## 2023-10-30 ENCOUNTER — OFFICE (OUTPATIENT)
Dept: URBAN - METROPOLITAN AREA CLINIC 66 | Facility: CLINIC | Age: 63
End: 2023-10-30

## 2023-10-30 VITALS
SYSTOLIC BLOOD PRESSURE: 105 MMHG | DIASTOLIC BLOOD PRESSURE: 62 MMHG | WEIGHT: 149 LBS | HEIGHT: 63 IN | HEART RATE: 68 BPM

## 2023-10-30 DIAGNOSIS — R13.10 DYSPHAGIA, UNSPECIFIED: ICD-10-CM

## 2023-10-30 DIAGNOSIS — K21.9 GASTRO-ESOPHAGEAL REFLUX DISEASE WITHOUT ESOPHAGITIS: ICD-10-CM

## 2023-10-30 DIAGNOSIS — R05.3 CHRONIC COUGH: ICD-10-CM

## 2023-10-30 PROCEDURE — 99214 OFFICE O/P EST MOD 30 MIN: CPT | Performed by: NURSE PRACTITIONER

## 2023-10-30 RX ORDER — FAMOTIDINE 40 MG/1
40 TABLET, FILM COATED ORAL
Qty: 180 | Refills: 3 | Status: COMPLETED
Start: 2022-06-22 | End: 2024-06-26

## 2023-10-30 RX ORDER — OMEPRAZOLE 20 MG/1
20 CAPSULE, DELAYED RELEASE ORAL
Qty: 90 | Refills: 3 | Status: COMPLETED
Start: 2021-04-05 | End: 2023-10-30

## 2023-11-16 ENCOUNTER — OFFICE (OUTPATIENT)
Dept: URBAN - METROPOLITAN AREA PATHOLOGY 4 | Facility: PATHOLOGY | Age: 63
End: 2023-11-16

## 2023-11-16 ENCOUNTER — AMBULATORY SURGICAL CENTER (OUTPATIENT)
Dept: URBAN - METROPOLITAN AREA SURGERY 20 | Facility: SURGERY | Age: 63
End: 2023-11-16

## 2023-11-16 VITALS — HEIGHT: 63 IN

## 2023-11-16 DIAGNOSIS — K31.89 OTHER DISEASES OF STOMACH AND DUODENUM: ICD-10-CM

## 2023-11-16 DIAGNOSIS — K22.4 DYSKINESIA OF ESOPHAGUS: ICD-10-CM

## 2023-11-16 DIAGNOSIS — K22.2 ESOPHAGEAL OBSTRUCTION: ICD-10-CM

## 2023-11-16 DIAGNOSIS — R13.10 DYSPHAGIA, UNSPECIFIED: ICD-10-CM

## 2023-11-16 DIAGNOSIS — K21.9 GASTRO-ESOPHAGEAL REFLUX DISEASE WITHOUT ESOPHAGITIS: ICD-10-CM

## 2023-11-16 LAB
GI HISTOLOGY: A. SELECT: (no result)
GI HISTOLOGY: B. SELECT: (no result)
GI HISTOLOGY: C. SELECT: (no result)
GI HISTOLOGY: PDF REPORT: (no result)

## 2023-11-16 PROCEDURE — 43450 DILATE ESOPHAGUS 1/MULT PASS: CPT | Performed by: INTERNAL MEDICINE

## 2023-11-16 PROCEDURE — 43239 EGD BIOPSY SINGLE/MULTIPLE: CPT | Performed by: INTERNAL MEDICINE

## 2023-11-16 PROCEDURE — 88305 TISSUE EXAM BY PATHOLOGIST: CPT | Performed by: INTERNAL MEDICINE

## 2023-11-20 ENCOUNTER — TELEPHONE (OUTPATIENT)
Dept: CARDIOLOGY | Facility: CLINIC | Age: 63
End: 2023-11-20
Payer: MEDICARE

## 2023-11-20 NOTE — TELEPHONE ENCOUNTER
I called pt but had to leave a voicemail message for pt to call back regarding her appointment./keerthi

## 2023-11-20 NOTE — TELEPHONE ENCOUNTER
Caller: Citlali Bocanegra    Relationship to patient: Self    Best call back number: [phone]    Chief complaint: WANTS TO MOVE APPT OUT TILL NEXT YEAR    Type of visit: NEW PATIENT    Requested date: N/A     If rescheduling, when is the original appointment:  11.29.23    Additional notes: NONE

## 2023-11-29 ENCOUNTER — HOSPITAL ENCOUNTER (OUTPATIENT)
Dept: GENERAL RADIOLOGY | Facility: HOSPITAL | Age: 63
Discharge: HOME | End: 2023-11-29
Admitting: ALLERGY & IMMUNOLOGY
Payer: MEDICARE

## 2023-11-29 ENCOUNTER — OFFICE VISIT (OUTPATIENT)
Dept: CARDIOLOGY | Facility: CLINIC | Age: 63
End: 2023-11-29
Payer: MEDICARE

## 2023-11-29 VITALS
WEIGHT: 143.8 LBS | BODY MASS INDEX: 25.48 KG/M2 | DIASTOLIC BLOOD PRESSURE: 60 MMHG | SYSTOLIC BLOOD PRESSURE: 106 MMHG | HEIGHT: 63 IN | HEART RATE: 81 BPM

## 2023-11-29 DIAGNOSIS — E78.2 MIXED HYPERLIPIDEMIA: ICD-10-CM

## 2023-11-29 DIAGNOSIS — R05.9 COUGH, UNSPECIFIED TYPE: ICD-10-CM

## 2023-11-29 DIAGNOSIS — Z82.49 FAMILY HISTORY OF CORONARY ARTERY DISEASE: Primary | ICD-10-CM

## 2023-11-29 DIAGNOSIS — R07.2 PRECORDIAL PAIN: ICD-10-CM

## 2023-11-29 PROCEDURE — 71046 X-RAY EXAM CHEST 2 VIEWS: CPT

## 2023-11-29 RX ORDER — BENZONATATE 200 MG/1
1 CAPSULE ORAL EVERY 12 HOURS SCHEDULED
COMMUNITY
Start: 2023-11-02

## 2023-11-29 RX ORDER — AMITRIPTYLINE HYDROCHLORIDE 10 MG/1
30 TABLET, FILM COATED ORAL
COMMUNITY
Start: 2023-11-16

## 2023-11-29 RX ORDER — OMEGA-3S/DHA/EPA/FISH OIL/D3 300MG-1000
400 CAPSULE ORAL DAILY
COMMUNITY

## 2023-11-29 NOTE — PROGRESS NOTES
[facility]      Patient Name: Citlali Bocanegra  :1960  Age: 63 y.o.  Encounter Provider:  Oz Bryant Jr, MD      Chief Complaint:   Chief Complaint   Patient presents with    Chest Pain         Chest Pain   Pertinent negatives include no abdominal pain, cough, dizziness, fever, near-syncope, orthopnea, palpitations, PND or syncope.   Citlali Bocanegra is a 63 y.o. female past medical history of hyperlipidemia who presents for initial evaluation of chest discomfort.  I have met her on several occasions as she usually accompanies her  to clinic visit and he has been my patient for a few years now.  She has no cardiac history but has a strong family history with mother being diagnosed with CAD sometime in her 60s and ultimately having surgical bypass at age 76.  She is not very active but with her activities of daily living she denies angina.  No orthopnea, PND or edema.  No palpitations, dizziness or syncope.  She has had 2 episodes of very atypical precordial pain which she describes as left anterior thoracic discomfort with a sharp/stinging quality that lasts for seconds with no relationship to physical activity.  The 2 episodes occurred in different regions of the left anterior thorax.  No associated shortness of air, diaphoresis, nausea or vomiting.  She is a former smoker quit in  and denies alcohol or illicit drug use.  No cardiac complaints at time of interview.  EKG shows sinus rhythm with nonspecific repolarization abnormalities.      The following portions of the patient's history were reviewed and updated as appropriate: allergies, current medications, past family history, past medical history, past social history, past surgical history and problem list.      Review of Systems   Constitutional: Negative for chills and fever.   HENT:  Negative for hoarse voice and sore throat.    Eyes:  Negative for double vision and photophobia.   Cardiovascular:  Positive for chest  "pain. Negative for leg swelling, near-syncope, orthopnea, palpitations, paroxysmal nocturnal dyspnea and syncope.   Respiratory:  Negative for cough and wheezing.    Skin:  Negative for poor wound healing and rash.   Musculoskeletal:  Negative for arthritis and joint swelling.   Gastrointestinal:  Negative for bloating, abdominal pain, hematemesis and hematochezia.   Neurological:  Negative for dizziness and focal weakness.   Psychiatric/Behavioral:  Negative for depression and suicidal ideas.      OBJECTIVE:   Vital Signs  Vitals:    11/29/23 1111   BP: 106/60   Pulse: 81     Estimated body mass index is 25.47 kg/m² as calculated from the following:    Height as of this encounter: 160 cm (63\").    Weight as of this encounter: 65.2 kg (143 lb 12.8 oz).    Vitals reviewed.   Constitutional:       Appearance: Healthy appearance. Not in distress.   Neck:      Vascular: No JVR. JVD normal.   Pulmonary:      Effort: Pulmonary effort is normal.      Breath sounds: Normal breath sounds. No wheezing. No rhonchi. No rales.   Chest:      Chest wall: Not tender to palpatation.   Cardiovascular:      PMI at left midclavicular line. Normal rate. Regular rhythm. Normal S1. Normal S2.       Murmurs: There is no murmur.      No gallop.  No click. No rub.   Pulses:     Intact distal pulses.   Edema:     Peripheral edema absent.   Abdominal:      General: Bowel sounds are normal.      Palpations: Abdomen is soft.      Tenderness: There is no abdominal tenderness.   Musculoskeletal: Normal range of motion.         General: No tenderness. Skin:     General: Skin is warm and dry.   Neurological:      General: No focal deficit present.      Mental Status: Alert and oriented to person, place and time.       ECG 12 Lead    Date/Time: 11/29/2023 11:17 AM  Performed by: Oz Bryant Jr., MD    Authorized by: Oz Bryant Jr., MD  Comparison: compared with previous ECG from 3/16/2018  Similar to previous ECG  Rhythm: sinus rhythm  Other " findings: non-specific ST-T wave changes    Clinical impression: non-specific ECG      Lipid Panel          2/28/2023    09:46 8/1/2023    11:00   Lipid Panel   Total Cholesterol 237  246    Triglycerides 154  171    HDL Cholesterol 71  78    VLDL Cholesterol 27  30    LDL Cholesterol  139  138         BUN   Date Value Ref Range Status   08/01/2023 12 8 - 23 mg/dL Final     Creatinine   Date Value Ref Range Status   08/01/2023 0.78 0.57 - 1.00 mg/dL Final     Potassium   Date Value Ref Range Status   08/01/2023 4.4 3.5 - 5.2 mmol/L Final     ALT (SGPT)   Date Value Ref Range Status   08/01/2023 17 1 - 33 U/L Final     AST (SGOT)   Date Value Ref Range Status   08/01/2023 14 1 - 32 U/L Final           ASSESSMENT:     63-year-old female with family history of coronary artery disease and remote history of tobacco abuse presents for evaluation of precordial pain      PLAN OF CARE:     Precordial pain -very atypical characteristics which are low risk especially concerning her normal cardiac exam and unremarkable EKG.  Given her family history and remote history of tobacco abuse I offered screening strategy with coronary artery calcium score.  She would like to think about this and I have provided her with educational material to review prior to making a decision.  Mixed hyperlipidemia -she is lost about 20 pounds over the past 6 months and is due for repeat lipid profile as she is trying to manage this through diet and exercise.  If calcium score is greater than 100 we would recommend Plavix and statin.  She has history of aspirin allergy with hives.  If ischemic workup is necessary we will discuss her case details with her allergist as we may require aspirin desensitization.    Return to clinic 6 months             Discharge Medications            Accurate as of November 29, 2023 11:17 AM. If you have any questions, ask your nurse or doctor.                Continue These Medications        Instructions Start Date    albuterol sulfate  (90 Base) MCG/ACT inhaler  Commonly known as: PROVENTIL HFA;VENTOLIN HFA;PROAIR HFA   2 puffs, Inhalation, Every 4 Hours PRN      amitriptyline 10 MG tablet  Commonly known as: ELAVIL   30 tablets      benzonatate 200 MG capsule  Commonly known as: TESSALON   1 capsule, Oral, Every 12 Hours Scheduled      CALCIUM PO   Oral      cholecalciferol 10 MCG (400 UNIT) tablet  Commonly known as: VITAMIN D3   400 Units, Oral, Daily      citalopram 20 MG tablet  Commonly known as: CeleXA   20 mg, Oral, Daily      famotidine 20 MG tablet  Commonly known as: PEPCID   20 mg, Oral      levocetirizine 5 MG tablet  Commonly known as: XYZAL   5 mg, Oral, Daily      montelukast 10 MG tablet  Commonly known as: SINGULAIR   10 mg, Oral, Daily      multivitamin with minerals tablet tablet   1 tablet, Oral, Daily      OMEGA-3 FISH OIL PO   Oral      omeprazole 20 MG capsule  Commonly known as: priLOSEC   20 mg, Oral, Daily      STOOL SOFTENER PO   Oral      TRELEGY ELLIPTA IN   Inhalation      vitamin C 250 MG tablet  Commonly known as: ASCORBIC ACID   250 mg, Oral, Daily      VITAMIN D PO   Oral               Thank you for allowing me to participate in the care of your patient,      Sincerely,   Oz Bryant MD  [facility]  11/29/23  11:17 EST

## 2024-01-05 ENCOUNTER — OFFICE VISIT (OUTPATIENT)
Dept: FAMILY MEDICINE CLINIC | Facility: CLINIC | Age: 64
End: 2024-01-05
Payer: MEDICARE

## 2024-01-05 VITALS
HEIGHT: 63 IN | SYSTOLIC BLOOD PRESSURE: 106 MMHG | OXYGEN SATURATION: 95 % | DIASTOLIC BLOOD PRESSURE: 64 MMHG | RESPIRATION RATE: 14 BRPM | BODY MASS INDEX: 24.98 KG/M2 | HEART RATE: 55 BPM | WEIGHT: 141 LBS

## 2024-01-05 DIAGNOSIS — F51.5 NIGHTMARES: ICD-10-CM

## 2024-01-05 DIAGNOSIS — F41.9 ANXIETY: ICD-10-CM

## 2024-01-05 DIAGNOSIS — K21.9 GASTROESOPHAGEAL REFLUX DISEASE WITHOUT ESOPHAGITIS: ICD-10-CM

## 2024-01-05 DIAGNOSIS — R11.2 NAUSEA AND VOMITING, UNSPECIFIED VOMITING TYPE: ICD-10-CM

## 2024-01-05 DIAGNOSIS — J30.1 SEASONAL ALLERGIC RHINITIS DUE TO POLLEN: ICD-10-CM

## 2024-01-05 DIAGNOSIS — R05.3 CHRONIC COUGH: Primary | ICD-10-CM

## 2024-01-05 DIAGNOSIS — F33.1 MODERATE EPISODE OF RECURRENT MAJOR DEPRESSIVE DISORDER: ICD-10-CM

## 2024-01-05 PROBLEM — K31.89 OTHER DISEASES OF STOMACH AND DUODENUM: Status: ACTIVE | Noted: 2023-11-16

## 2024-01-05 PROBLEM — K60.2 ANAL FISSURE: Status: ACTIVE | Noted: 2024-01-05

## 2024-01-05 PROBLEM — K59.00 CONSTIPATION: Status: ACTIVE | Noted: 2024-01-05

## 2024-01-05 PROBLEM — F32.9 MAJOR DEPRESSIVE DISORDER, SINGLE EPISODE, UNSPECIFIED: Status: ACTIVE | Noted: 2024-01-05

## 2024-01-05 PROBLEM — K64.8 HEMORRHOIDS, INTERNAL: Status: ACTIVE | Noted: 2024-01-05

## 2024-01-05 PROBLEM — R13.10 DYSPHAGIA: Status: ACTIVE | Noted: 2024-01-05

## 2024-01-05 PROBLEM — R05.9 COUGH: Status: ACTIVE | Noted: 2024-01-05

## 2024-01-05 PROBLEM — K22.2 ESOPHAGEAL OBSTRUCTION: Status: ACTIVE | Noted: 2023-11-16

## 2024-01-05 PROBLEM — I10 HIGH BLOOD PRESSURE: Status: ACTIVE | Noted: 2024-01-05

## 2024-01-05 RX ORDER — OMEPRAZOLE 20 MG/1
20 CAPSULE, DELAYED RELEASE ORAL DAILY
Qty: 60 CAPSULE | Refills: 4 | Status: SHIPPED | OUTPATIENT
Start: 2024-01-05

## 2024-01-05 NOTE — PROGRESS NOTES
"Chief Complaint  Follow-up (On medications )    Subjective          History of Present Illness    Patient presented to office today to discuss all current medications.  Patient wanted to discontinue unnecessary medications and discussed different side effects of medications.      Patient reports she had a chronic cough for over 6 months, cough has finally resolved.  Has a history of allergic rhinitis, current medications trilogy inhaler, Xyzal, Singulair, Tessalon Perles.  Patient reports she has stopped taking the Singulair, provider reports that patient's allergies are under control, may discontinue Singulair, Tessalon Perles, inhaler.      Patient reports last Friday she had an episode where she violently threw up for hours.  Unsure if it was triggered by food or virus.  Reports she has not taken any medication since last week, unsure if discontinuing certain medications at once or causing side effects.      Patient reports she has been experiencing nightmares for the last week.  We discussed nightmares may be caused from discontinuing Celexa without weaning off properly.  Reports she has not taken her Celexa in over 1 week.  Patient encouraged to continue celexa, she would like to start back at 10mg.      Patient has chronic history of Gerd.  Current medication regimen omeprazole, Pepcid, Protonix.  Patient reports she took Protonix for 1 to 2 days and experienced severe acid reflux, switch back to omeprazole.  Discussed discontinuing Protonix and Pepcid, only taking omeprazole.  Try to wean off omeprazole by taking medication every other day or as needed.  Patient also on amitriptyline per GI specialist to help relax esophagus, provider discussed with patient she would need to contact GI office to discuss discontinuing this medication.      Objective   Vital Signs:  /64 (BP Location: Right arm, Patient Position: Sitting, Cuff Size: Large Adult)   Pulse 55   Resp 14   Ht 160 cm (62.99\")   Wt 64 kg (141 " "lb)   SpO2 95%   BMI 24.98 kg/m²   Estimated body mass index is 24.98 kg/m² as calculated from the following:    Height as of this encounter: 160 cm (62.99\").    Weight as of this encounter: 64 kg (141 lb).               Physical Exam  Vitals reviewed.   Constitutional:       General: She is not in acute distress.     Appearance: Normal appearance.   HENT:      Head: Normocephalic and atraumatic.   Cardiovascular:      Rate and Rhythm: Normal rate and regular rhythm.      Pulses: Normal pulses.      Heart sounds: No murmur heard.     No gallop.   Pulmonary:      Effort: Pulmonary effort is normal. No respiratory distress.      Breath sounds: Normal breath sounds. No wheezing.   Abdominal:      General: Bowel sounds are normal. There is no distension.      Palpations: Abdomen is soft.      Tenderness: There is no abdominal tenderness.   Musculoskeletal:         General: Normal range of motion.      Cervical back: Normal range of motion and neck supple. No tenderness.   Skin:     General: Skin is warm and dry.   Neurological:      Mental Status: She is alert and oriented to person, place, and time. Mental status is at baseline.   Psychiatric:         Mood and Affect: Mood is anxious.        Result Review :                   Assessment and Plan   Diagnoses and all orders for this visit:    1. Chronic cough (Primary)    2. Nausea and vomiting, unspecified vomiting type    3. Nightmares    4. Moderate episode of recurrent major depressive disorder    5. Anxiety    6. Seasonal allergic rhinitis due to pollen    7. Gastroesophageal reflux disease without esophagitis      Continue Xyzal.  Restart Celexa at 10 mg daily.  Continue omeprazole.  Patient would like refill request sent to PCP Dr. Harvey, reports another provider name on her prescriptions will cause confusion.     Discontinue Tessalon Perles, Pepcid, Protonix, Singulair, Trelegy inhaler.  Contact GI office and discussed discontinuing amitriptyline.    Follow Up "   Return if symptoms worsen or fail to improve.  Patient was given instructions and counseling regarding her condition or for health maintenance advice. Please see specific information pulled into the AVS if appropriate.

## 2024-01-22 DIAGNOSIS — F32.A ANXIETY AND DEPRESSION: ICD-10-CM

## 2024-01-22 DIAGNOSIS — F41.9 ANXIETY AND DEPRESSION: ICD-10-CM

## 2024-01-22 RX ORDER — CITALOPRAM 20 MG/1
20 TABLET ORAL DAILY
Qty: 30 TABLET | Refills: 4 | Status: SHIPPED | OUTPATIENT
Start: 2024-01-22

## 2024-02-27 ENCOUNTER — OFFICE VISIT (OUTPATIENT)
Dept: FAMILY MEDICINE CLINIC | Facility: CLINIC | Age: 64
End: 2024-02-27
Payer: MEDICARE

## 2024-02-27 VITALS
BODY MASS INDEX: 25.09 KG/M2 | SYSTOLIC BLOOD PRESSURE: 106 MMHG | OXYGEN SATURATION: 98 % | HEIGHT: 63 IN | TEMPERATURE: 97.8 F | WEIGHT: 141.6 LBS | RESPIRATION RATE: 18 BRPM | DIASTOLIC BLOOD PRESSURE: 76 MMHG | HEART RATE: 64 BPM

## 2024-02-27 DIAGNOSIS — K21.9 GASTROESOPHAGEAL REFLUX DISEASE WITHOUT ESOPHAGITIS: ICD-10-CM

## 2024-02-27 DIAGNOSIS — F32.A ANXIETY AND DEPRESSION: ICD-10-CM

## 2024-02-27 DIAGNOSIS — F41.9 ANXIETY AND DEPRESSION: ICD-10-CM

## 2024-02-27 DIAGNOSIS — E78.2 HYPERLIPEMIA, MIXED: Primary | ICD-10-CM

## 2024-02-27 DIAGNOSIS — F41.9 ANXIETY: ICD-10-CM

## 2024-02-27 DIAGNOSIS — F33.1 MODERATE EPISODE OF RECURRENT MAJOR DEPRESSIVE DISORDER: ICD-10-CM

## 2024-02-27 NOTE — PROGRESS NOTES
"Chief Complaint  Hyperlipidemia    Subjective        Citlali Bocanegra presents to [facility]  Hyperlipidemia      Pt presents today for refills, labs and  HLD- no med and has been trying to work on diet and exercise.  She has actively lost about 23 lbs.  She has statin intolerance.    GERD stable with med.    Anxiety and depression - stable with med and no SI or HI.    Objective   Vital Signs:  /76 (BP Location: Left arm, Patient Position: Sitting, Cuff Size: Adult)   Pulse 64   Temp 97.8 °F (36.6 °C) (Tympanic)   Resp 18   Ht 160 cm (62.99\")   Wt 64.2 kg (141 lb 9.6 oz)   SpO2 98%   BMI 25.09 kg/m²   Estimated body mass index is 25.09 kg/m² as calculated from the following:    Height as of this encounter: 160 cm (62.99\").    Weight as of this encounter: 64.2 kg (141 lb 9.6 oz).               Physical Exam  Vitals and nursing note reviewed.   Constitutional:       Appearance: Normal appearance. She is well-developed.   Cardiovascular:      Rate and Rhythm: Normal rate and regular rhythm.      Heart sounds: Normal heart sounds. No murmur heard.  Pulmonary:      Effort: Pulmonary effort is normal. No respiratory distress.      Breath sounds: Normal breath sounds. No stridor. No wheezing or rhonchi.   Neurological:      General: No focal deficit present.      Mental Status: She is alert and oriented to person, place, and time. She is not disoriented.   Psychiatric:         Mood and Affect: Mood normal.         Behavior: Behavior normal.        Result Review :                     Assessment and Plan     Diagnoses and all orders for this visit:    1. Hyperlipemia, mixed (Primary)  -     Lipid Panel  -     Comprehensive Metabolic Panel    2. Gastroesophageal reflux disease without esophagitis    3. Anxiety and depression    4. Moderate episode of recurrent major depressive disorder    5. Anxiety             Follow Up     No follow-ups on file.  Patient was given instructions and " counseling regarding her condition or for health maintenance advice. Please see specific information pulled into the AVS if appropriate.     Refills and continue to work on diet and exercise.  Will get labs done later this week bc currently not fasting.

## 2024-03-09 LAB
ALBUMIN SERPL-MCNC: 4.4 G/DL (ref 3.5–5.2)
ALBUMIN/GLOB SERPL: 1.8 G/DL
ALP SERPL-CCNC: 84 U/L (ref 39–117)
ALT SERPL-CCNC: 15 U/L (ref 1–33)
AST SERPL-CCNC: 16 U/L (ref 1–32)
BILIRUB SERPL-MCNC: 0.4 MG/DL (ref 0–1.2)
BUN SERPL-MCNC: 11 MG/DL (ref 8–23)
BUN/CREAT SERPL: 13.3 (ref 7–25)
CALCIUM SERPL-MCNC: 9.1 MG/DL (ref 8.6–10.5)
CHLORIDE SERPL-SCNC: 105 MMOL/L (ref 98–107)
CHOLEST SERPL-MCNC: 244 MG/DL (ref 0–200)
CO2 SERPL-SCNC: 28.2 MMOL/L (ref 22–29)
CREAT SERPL-MCNC: 0.83 MG/DL (ref 0.57–1)
EGFRCR SERPLBLD CKD-EPI 2021: 78.3 ML/MIN/1.73
GLOBULIN SER CALC-MCNC: 2.4 GM/DL
GLUCOSE SERPL-MCNC: 91 MG/DL (ref 65–99)
HDLC SERPL-MCNC: 89 MG/DL (ref 40–60)
LDLC SERPL CALC-MCNC: 142 MG/DL (ref 0–100)
POTASSIUM SERPL-SCNC: 4.3 MMOL/L (ref 3.5–5.2)
PROT SERPL-MCNC: 6.8 G/DL (ref 6–8.5)
SODIUM SERPL-SCNC: 143 MMOL/L (ref 136–145)
TRIGL SERPL-MCNC: 76 MG/DL (ref 0–150)
VLDLC SERPL CALC-MCNC: 13 MG/DL (ref 5–40)

## 2024-05-09 DIAGNOSIS — F32.A ANXIETY AND DEPRESSION: ICD-10-CM

## 2024-05-09 DIAGNOSIS — F41.9 ANXIETY AND DEPRESSION: ICD-10-CM

## 2024-05-09 RX ORDER — CITALOPRAM 20 MG/1
20 TABLET ORAL DAILY
Qty: 30 TABLET | Refills: 4 | Status: SHIPPED | OUTPATIENT
Start: 2024-05-09

## 2024-05-24 ENCOUNTER — OFFICE VISIT (OUTPATIENT)
Dept: FAMILY MEDICINE CLINIC | Facility: CLINIC | Age: 64
End: 2024-05-24
Payer: MEDICARE

## 2024-05-24 VITALS
DIASTOLIC BLOOD PRESSURE: 74 MMHG | BODY MASS INDEX: 24.27 KG/M2 | HEIGHT: 63 IN | SYSTOLIC BLOOD PRESSURE: 108 MMHG | TEMPERATURE: 97.8 F | OXYGEN SATURATION: 96 % | HEART RATE: 62 BPM | WEIGHT: 137 LBS

## 2024-05-24 DIAGNOSIS — F32.1 CURRENT MODERATE EPISODE OF MAJOR DEPRESSIVE DISORDER WITHOUT PRIOR EPISODE: ICD-10-CM

## 2024-05-24 DIAGNOSIS — M85.89 OSTEOPENIA OF MULTIPLE SITES: ICD-10-CM

## 2024-05-24 DIAGNOSIS — E78.2 HYPERLIPEMIA, MIXED: Primary | ICD-10-CM

## 2024-05-24 PROCEDURE — 1126F AMNT PAIN NOTED NONE PRSNT: CPT | Performed by: FAMILY MEDICINE

## 2024-05-24 PROCEDURE — 3074F SYST BP LT 130 MM HG: CPT | Performed by: FAMILY MEDICINE

## 2024-05-24 PROCEDURE — 1159F MED LIST DOCD IN RCRD: CPT | Performed by: FAMILY MEDICINE

## 2024-05-24 PROCEDURE — 3078F DIAST BP <80 MM HG: CPT | Performed by: FAMILY MEDICINE

## 2024-05-24 PROCEDURE — 1160F RVW MEDS BY RX/DR IN RCRD: CPT | Performed by: FAMILY MEDICINE

## 2024-05-24 PROCEDURE — G2211 COMPLEX E/M VISIT ADD ON: HCPCS | Performed by: FAMILY MEDICINE

## 2024-05-24 PROCEDURE — 99214 OFFICE O/P EST MOD 30 MIN: CPT | Performed by: FAMILY MEDICINE

## 2024-05-24 NOTE — PROGRESS NOTES
"Chief Complaint  Hyperlipidemia and osteopenia (Does not want to start Prolia)    Subjective        Citlali Bocanegra presents to [facility]  Hyperlipidemia      Pt presents today for follow up and  HLD- no med but her HDL is 89.  Osteopenia and was told she needs to start prolia by her gyn but not interested in this currently.  Depression- no HI or SI and stable with med.  Taking 10 mg    Objective   Vital Signs:  /74   Pulse 62   Temp 97.8 °F (36.6 °C) (Infrared)   Ht 160 cm (63\")   Wt 62.1 kg (137 lb)   SpO2 96%   BMI 24.27 kg/m²   Estimated body mass index is 24.27 kg/m² as calculated from the following:    Height as of this encounter: 160 cm (63\").    Weight as of this encounter: 62.1 kg (137 lb).       BMI is within normal parameters. No other follow-up for BMI required.      Physical Exam  Vitals and nursing note reviewed.   Constitutional:       Appearance: Normal appearance. She is well-developed.   Cardiovascular:      Rate and Rhythm: Normal rate and regular rhythm.      Heart sounds: Normal heart sounds. No murmur heard.  Pulmonary:      Effort: Pulmonary effort is normal. No respiratory distress.      Breath sounds: Normal breath sounds. No stridor. No wheezing or rhonchi.   Neurological:      General: No focal deficit present.      Mental Status: She is alert and oriented to person, place, and time. She is not disoriented.   Psychiatric:         Mood and Affect: Mood normal.         Behavior: Behavior normal.        Result Review :                     Assessment and Plan     Diagnoses and all orders for this visit:    1. Hyperlipemia, mixed (Primary)    2. Osteopenia of multiple sites    3. Current moderate episode of major depressive disorder without prior episode             Follow Up     Return in about 3 months (around 8/24/2024) for hyperlipidema.  Patient was given instructions and counseling regarding her condition or for health maintenance advice. Please " see specific information pulled into the AVS if appropriate.     Continue current med and work on weight bearing exercises.    Refills on med.    Answers submitted by the patient for this visit:  Other (Submitted on 5/22/2024)  Please describe your symptoms.: Dexascan questions, mammogram questions, high cholesterol, etc.  Have you had these symptoms before?: Yes  How long have you been having these symptoms?: Greater than 2 weeks  Please list any medications you are currently taking for this condition.: None  Please describe any probable cause for these symptoms. : Unknown  Primary Reason for Visit (Submitted on 5/22/2024)  What is the primary reason for your visit?: Other

## 2024-06-26 ENCOUNTER — OFFICE (OUTPATIENT)
Dept: URBAN - METROPOLITAN AREA CLINIC 76 | Facility: CLINIC | Age: 64
End: 2024-06-26
Payer: MEDICARE

## 2024-06-26 VITALS
HEART RATE: 65 BPM | HEIGHT: 63 IN | DIASTOLIC BLOOD PRESSURE: 72 MMHG | SYSTOLIC BLOOD PRESSURE: 108 MMHG | WEIGHT: 139 LBS

## 2024-06-26 DIAGNOSIS — K21.9 GASTRO-ESOPHAGEAL REFLUX DISEASE WITHOUT ESOPHAGITIS: ICD-10-CM

## 2024-06-26 PROCEDURE — 99213 OFFICE O/P EST LOW 20 MIN: CPT | Performed by: INTERNAL MEDICINE

## 2024-06-26 RX ORDER — OMEPRAZOLE 20 MG/1
20 CAPSULE, DELAYED RELEASE ORAL
Qty: 90 | Refills: 3 | Status: ACTIVE

## 2024-08-16 ENCOUNTER — OFFICE VISIT (OUTPATIENT)
Dept: FAMILY MEDICINE CLINIC | Facility: CLINIC | Age: 64
End: 2024-08-16
Payer: MEDICARE

## 2024-08-16 VITALS
SYSTOLIC BLOOD PRESSURE: 98 MMHG | BODY MASS INDEX: 24.27 KG/M2 | TEMPERATURE: 97.8 F | HEART RATE: 62 BPM | WEIGHT: 137 LBS | DIASTOLIC BLOOD PRESSURE: 60 MMHG | RESPIRATION RATE: 18 BRPM | HEIGHT: 63 IN | OXYGEN SATURATION: 98 %

## 2024-08-16 DIAGNOSIS — F33.1 MODERATE EPISODE OF RECURRENT MAJOR DEPRESSIVE DISORDER: ICD-10-CM

## 2024-08-16 DIAGNOSIS — E78.2 HYPERLIPEMIA, MIXED: Primary | ICD-10-CM

## 2024-08-16 PROCEDURE — 1160F RVW MEDS BY RX/DR IN RCRD: CPT | Performed by: FAMILY MEDICINE

## 2024-08-16 PROCEDURE — 3074F SYST BP LT 130 MM HG: CPT | Performed by: FAMILY MEDICINE

## 2024-08-16 PROCEDURE — 1126F AMNT PAIN NOTED NONE PRSNT: CPT | Performed by: FAMILY MEDICINE

## 2024-08-16 PROCEDURE — 3078F DIAST BP <80 MM HG: CPT | Performed by: FAMILY MEDICINE

## 2024-08-16 PROCEDURE — 1159F MED LIST DOCD IN RCRD: CPT | Performed by: FAMILY MEDICINE

## 2024-08-16 PROCEDURE — G2211 COMPLEX E/M VISIT ADD ON: HCPCS | Performed by: FAMILY MEDICINE

## 2024-08-16 PROCEDURE — 99214 OFFICE O/P EST MOD 30 MIN: CPT | Performed by: FAMILY MEDICINE

## 2024-08-16 NOTE — PROGRESS NOTES
"Chief Complaint  Hyperlipidemia (Pt is here for 3m f/u and is fasting )    Subjective        Citlali Bocanegra presents to [facility]  Hyperlipidemia      PT presents today for follow up and   HLD- no med and her HLD is at 89.  Try to stay active.    Depression- takes 10 mg daily and doing well with it.  No HI or SI.    Objective   Vital Signs:  BP 98/60 (BP Location: Left arm, Patient Position: Sitting, Cuff Size: Adult)   Pulse 62   Temp 97.8 °F (36.6 °C) (Tympanic)   Resp 18   Ht 160 cm (62.99\")   Wt 62.1 kg (137 lb)   SpO2 98%   BMI 24.27 kg/m²   Estimated body mass index is 24.27 kg/m² as calculated from the following:    Height as of this encounter: 160 cm (62.99\").    Weight as of this encounter: 62.1 kg (137 lb).    BMI is within normal parameters. No other follow-up for BMI required.      Physical Exam  Vitals and nursing note reviewed.   Constitutional:       Appearance: Normal appearance. She is well-developed.   Cardiovascular:      Rate and Rhythm: Normal rate and regular rhythm.      Heart sounds: Normal heart sounds. No murmur heard.  Pulmonary:      Effort: Pulmonary effort is normal. No respiratory distress.      Breath sounds: Normal breath sounds. No stridor. No wheezing or rhonchi.   Neurological:      General: No focal deficit present.      Mental Status: She is alert and oriented to person, place, and time. She is not disoriented.   Psychiatric:         Mood and Affect: Mood normal.         Behavior: Behavior normal.        Result Review :                Assessment and Plan   Diagnoses and all orders for this visit:    1. Hyperlipemia, mixed (Primary)  -     Lipid Panel  -     Comprehensive Metabolic Panel    2. Moderate episode of recurrent major depressive disorder             Follow Up   Return in about 1 month (around 9/16/2024) for teagan feldman in next week and regular 6 month check up.  .  Patient was given instructions and counseling regarding her " condition or for health maintenance advice. Please see specific information pulled into the AVS if appropriate.       Refills and labs, work on diet and exercise.

## 2024-08-17 LAB
ALBUMIN SERPL-MCNC: 4.3 G/DL (ref 3.9–4.9)
ALP SERPL-CCNC: 88 IU/L (ref 44–121)
ALT SERPL-CCNC: 14 IU/L (ref 0–32)
AST SERPL-CCNC: 15 IU/L (ref 0–40)
BILIRUB SERPL-MCNC: 0.5 MG/DL (ref 0–1.2)
BUN SERPL-MCNC: 13 MG/DL (ref 8–27)
BUN/CREAT SERPL: 15 (ref 12–28)
CALCIUM SERPL-MCNC: 9.7 MG/DL (ref 8.7–10.3)
CHLORIDE SERPL-SCNC: 100 MMOL/L (ref 96–106)
CHOLEST SERPL-MCNC: 238 MG/DL (ref 100–199)
CO2 SERPL-SCNC: 27 MMOL/L (ref 20–29)
CREAT SERPL-MCNC: 0.84 MG/DL (ref 0.57–1)
EGFRCR SERPLBLD CKD-EPI 2021: 77 ML/MIN/1.73
GLOBULIN SER CALC-MCNC: 2.8 G/DL (ref 1.5–4.5)
GLUCOSE SERPL-MCNC: 86 MG/DL (ref 70–99)
HDLC SERPL-MCNC: 88 MG/DL
LDLC SERPL CALC-MCNC: 136 MG/DL (ref 0–99)
POTASSIUM SERPL-SCNC: 4.2 MMOL/L (ref 3.5–5.2)
PROT SERPL-MCNC: 7.1 G/DL (ref 6–8.5)
SODIUM SERPL-SCNC: 141 MMOL/L (ref 134–144)
TRIGL SERPL-MCNC: 85 MG/DL (ref 0–149)
VLDLC SERPL CALC-MCNC: 14 MG/DL (ref 5–40)

## 2024-09-16 ENCOUNTER — TELEMEDICINE (OUTPATIENT)
Dept: FAMILY MEDICINE CLINIC | Facility: CLINIC | Age: 64
End: 2024-09-16
Payer: MEDICARE

## 2024-09-16 DIAGNOSIS — Z00.00 ENCOUNTER FOR MEDICARE ANNUAL WELLNESS EXAM: Primary | ICD-10-CM

## 2024-09-16 PROCEDURE — 1126F AMNT PAIN NOTED NONE PRSNT: CPT | Performed by: FAMILY MEDICINE

## 2024-09-16 PROCEDURE — 1159F MED LIST DOCD IN RCRD: CPT | Performed by: FAMILY MEDICINE

## 2024-09-16 PROCEDURE — G0439 PPPS, SUBSEQ VISIT: HCPCS | Performed by: FAMILY MEDICINE

## 2024-09-16 PROCEDURE — 1170F FXNL STATUS ASSESSED: CPT | Performed by: FAMILY MEDICINE

## 2024-09-16 PROCEDURE — 1160F RVW MEDS BY RX/DR IN RCRD: CPT | Performed by: FAMILY MEDICINE

## 2024-10-24 ENCOUNTER — CLINICAL SUPPORT (OUTPATIENT)
Dept: FAMILY MEDICINE CLINIC | Facility: CLINIC | Age: 64
End: 2024-10-24
Payer: MEDICARE

## 2024-10-24 DIAGNOSIS — Z23 IMMUNIZATION DUE: Primary | ICD-10-CM

## 2024-10-29 PROCEDURE — 90662 IIV NO PRSV INCREASED AG IM: CPT | Performed by: FAMILY MEDICINE

## 2024-10-29 PROCEDURE — G0008 ADMIN INFLUENZA VIRUS VAC: HCPCS | Performed by: FAMILY MEDICINE

## 2024-12-17 ENCOUNTER — TELEPHONE (OUTPATIENT)
Age: 64
End: 2024-12-17

## 2024-12-17 NOTE — TELEPHONE ENCOUNTER
Pt Citlail Bocanegra called to investigate the source for two missed calls from [facility] today. Per pt no msg available on her phone. We have ruled out the pacemaker dept for her father Levon Karimi. She is now checking on herself or her  both pts of Dr Bryant. Per Kristi, she has not tried to call the [facility] today for Dr Bryant. Per Ms Bocanegra, her  [place] Bocan[place] had a procedure originally for tomorrow but it was cancelled. Possibly someone in scheduling trying to call? Pt can be reached at [phone].

## 2024-12-19 ENCOUNTER — TELEPHONE (OUTPATIENT)
Dept: FAMILY MEDICINE CLINIC | Facility: CLINIC | Age: 64
End: 2024-12-19

## 2024-12-19 ENCOUNTER — TELEMEDICINE (OUTPATIENT)
Dept: FAMILY MEDICINE CLINIC | Facility: CLINIC | Age: 64
End: 2024-12-19
Payer: MEDICARE

## 2024-12-19 DIAGNOSIS — U07.1 COVID-19 VIRUS DETECTED: Primary | ICD-10-CM

## 2024-12-19 NOTE — PROGRESS NOTES
CHIEF COMPLAINT: Covid 19    Subjective   Citlali Bocanegra is a 64 y.o. female.     History of Present Illness   Patient presents during the Covid-19 pandemic/federally declared [facility] of public health emergency.  This service was conducted via video visit  PT is at home and I am at my desk at my office.    Patient presents today with a 1 day history of cough, congestion, sinus pain and fever of a Tmax of 100.5.  Lots of fatigue but no body aches.  She did test positive for COVID yesterday.    The following portions of the patient's history were reviewed and updated as appropriate: allergies, current medications, past family history, past medical history, past social history, past surgical history and problem list.    Review of Systems    Patient Active Problem List   Diagnosis    Allergic rhinitis    Moderate episode of recurrent major depressive disorder    Gastroesophageal reflux disease without esophagitis    Neuritis or radiculitis due to rupture of lumbar intervertebral disc    Osteoporosis    Lower esophageal ring    Herpes zoster    Sleep apnea    Rectal fissure    BRBPR (bright red blood per rectum)    Rotator cuff tendinitis, right    Family history of early CAD    Rotator cuff tendonitis, right    Encounter for Medicare annual wellness exam    Esophageal dysphagia    Chronic right shoulder pain    Hyperlipemia, mixed    Lumbar radiculopathy    Hip pain    Anxiety    Lateral epicondylitis (tennis elbow)    Statin declined    Anal fissure    Constipation    Cough    Hemorrhoids, internal    High blood pressure    Major depressive disorder, single episode, unspecified    Dysphagia    Esophageal obstruction    Gastroesophageal reflux disease    Other diseases of stomach and duodenum    Nausea & vomiting    Nightmares    Osteopenia of multiple sites       Allergies   Allergen Reactions    Aspirin Hives     Hives on face & neck    Entex T [Pseudoephedrine-Guaifenesin] Hives     Hives on face & neck    Nuprin  [Ibuprofen] Hives     Hives on face & neck         Current Outpatient Medications:     albuterol sulfate  (90 Base) MCG/ACT inhaler, Inhale 2 puffs Every 4 (Four) Hours As Needed for Wheezing. (Patient not taking: Reported on 8/16/2024), Disp: 18 g, Rfl: 3    CALCIUM PO, Take  by mouth., Disp: , Rfl:     citalopram (CeleXA) 20 MG tablet, Take 1 tablet by mouth Daily., Disp: 30 tablet, Rfl: 4    Docusate Calcium (STOOL SOFTENER PO), Take  by mouth., Disp: , Rfl:     levocetirizine (XYZAL) 5 MG tablet, Take 1 tablet by mouth Daily., Disp: , Rfl:     multivitamin with minerals tablet tablet, Take 1 tablet by mouth Daily., Disp: , Rfl:     Nirmatrelvir & Ritonavir, 300mg/100mg, (PAXLOVID), Take 3 tablets by mouth 2 (Two) Times a Day., Disp: 30 tablet, Rfl: 0    Omega-3 Fatty Acids (OMEGA-3 FISH OIL PO), Take  by mouth., Disp: , Rfl:     omeprazole (priLOSEC) 20 MG capsule, Take 1 capsule by mouth Daily., Disp: 60 capsule, Rfl: 4    vitamin C (ASCORBIC ACID) 250 MG tablet, Take 1 tablet by mouth Daily., Disp: , Rfl:     VITAMIN D PO, Take  by mouth., Disp: , Rfl:     Past Medical History:   Diagnosis Date    Depression     Environmental allergies     GERD (gastroesophageal reflux disease)     Hereditary and idiopathic peripheral neuropathy     Migraines     nocular    Nausea & vomiting 1/5/2024       Past Surgical History:   Procedure Laterality Date    COLONOSCOPY N/A 1/10/2019    Procedure: COLONOSCOPY TO CECUM;  Surgeon: Sridhar Altamirano MD;  Location: [facility] ENDOSCOPY;  Service: General    ENDOSCOPY N/A 10/30/2014    Mild Schatzki ring, Multiple gastric polyps, Normal examined duodenum, Non-erosive esophagela reflux (NERD) disease present, Dr. Mychal De La Cruz    ENDOSCOPY N/A 7/28/2020    Procedure: ESOPHAGOGASTRODUODENOSCOPY with cold polypectomies and balloon dilation 8mm-12mm;  Surgeon: Sridhar Altamirano MD;  Location: [facility] ENDOSCOPY;  Service: General;  Laterality: N/A;  pre -  dysphagia  post - gastric polyps, distal esophageal stricture       Family History   Problem Relation Age of Onset    Heart disease Mother         cabg x4    Diabetes Father         borderline    Glaucoma Father     Arrhythmia Father         pacemaker/svt    No Known Problems Other        Social History     Tobacco Use    Smoking status: Former     Current packs/day: 0.00     Types: Cigarettes     Start date: 1982     Quit date: 1985     Years since quittin.8     Passive exposure: Past    Smokeless tobacco: Never   Substance Use Topics    Alcohol use: No            Objective     There were no vitals taken for this visit. Video Visit    Physical Exam  NAD  AAOx3  No labored breathing.      Lab Results   Component Value Date    GLUCOSE 86 2024    BUN 13 2024    CREATININE 0.84 2024    EGFRIFNONA 94 2021    EGFRIFAFRI 114 2021    BCR 15 2024    K 4.2 2024    CO2 27 2024    CALCIUM 9.7 2024    PROTENTOTREF 7.1 2024    ALBUMIN 4.3 2024    LABIL2 1.8 2024    AST 15 2024    ALT 14 2024       WBC   Date Value Ref Range Status   2022 6.1 3.4 - 10.8 x10E3/uL Final     RBC   Date Value Ref Range Status   2022 4.52 3.77 - 5.28 x10E6/uL Final     Hemoglobin   Date Value Ref Range Status   2022 13.4 11.1 - 15.9 g/dL Final     Hematocrit   Date Value Ref Range Status   2022 39.4 34.0 - 46.6 % Final     MCV   Date Value Ref Range Status   2022 87 79 - 97 fL Final     MCH   Date Value Ref Range Status   2022 29.6 26.6 - 33.0 pg Final     MCHC   Date Value Ref Range Status   2022 34.0 31.5 - 35.7 g/dL Final     RDW   Date Value Ref Range Status   2022 12.7 11.7 - 15.4 % Final     Platelets   Date Value Ref Range Status   2022 285 150 - 450 x10E3/uL Final     Neutrophil Rel %   Date Value Ref Range Status   2022 43 Not Estab. % Final     Lymphocyte Rel %   Date Value Ref Range  "Status   05/05/2022 46 Not Estab. % Final     Monocyte Rel %   Date Value Ref Range Status   05/05/2022 9 Not Estab. % Final     Eosinophil Rel %   Date Value Ref Range Status   05/05/2022 1 Not Estab. % Final     Basophil Rel %   Date Value Ref Range Status   05/05/2022 1 Not Estab. % Final     Neutrophils Absolute   Date Value Ref Range Status   05/05/2022 2.6 1.4 - 7.0 x10E3/uL Final     Lymphocytes Absolute   Date Value Ref Range Status   05/05/2022 2.8 0.7 - 3.1 x10E3/uL Final     Monocytes Absolute   Date Value Ref Range Status   05/05/2022 0.6 0.1 - 0.9 x10E3/uL Final     Eosinophils Absolute   Date Value Ref Range Status   05/05/2022 0.0 0.0 - 0.4 x10E3/uL Final     Basophils Absolute   Date Value Ref Range Status   05/05/2022 0.0 0.0 - 0.2 x10E3/uL Final     nRBC   Date Value Ref Range Status   09/17/2020 0.0 0.0 - 0.2 /100 WBC Final       No results found for: \"HGBA1C\"    Lab Results   Component Value Date    VMFAOTJW99 754 02/25/2021       TSH   Date Value Ref Range Status   05/05/2022 2.620 0.450 - 4.500 uIU/mL Final       No results found for: \"CHOL\"  Lab Results   Component Value Date    TRIG 85 08/16/2024     Lab Results   Component Value Date    HDL 88 08/16/2024     Lab Results   Component Value Date     (H) 08/16/2024     Lab Results   Component Value Date    VLDL 14 08/16/2024     Lab Results   Component Value Date    LDLHDL 1.51 08/31/2018         Procedures    Assessment & Plan   Problems Addressed this Visit    None  Visit Diagnoses       COVID-19 virus detected    -  Primary    Relevant Medications    Nirmatrelvir & Ritonavir, 300mg/100mg, (PAXLOVID)          Diagnoses         Codes Comments    COVID-19 virus detected    -  Primary ICD-10-CM: U07.1  ICD-9-CM: 079.89             No orders of the defined types were placed in this encounter.      Current Outpatient Medications   Medication Sig Dispense Refill    albuterol sulfate  (90 Base) MCG/ACT inhaler Inhale 2 puffs Every 4 " (Four) Hours As Needed for Wheezing. (Patient not taking: Reported on 8/16/2024) 18 g 3    CALCIUM PO Take  by mouth.      citalopram (CeleXA) 20 MG tablet Take 1 tablet by mouth Daily. 30 tablet 4    Docusate Calcium (STOOL SOFTENER PO) Take  by mouth.      levocetirizine (XYZAL) 5 MG tablet Take 1 tablet by mouth Daily.      multivitamin with minerals tablet tablet Take 1 tablet by mouth Daily.      Nirmatrelvir & Ritonavir, 300mg/100mg, (PAXLOVID) Take 3 tablets by mouth 2 (Two) Times a Day. 30 tablet 0    Omega-3 Fatty Acids (OMEGA-3 FISH OIL PO) Take  by mouth.      omeprazole (priLOSEC) 20 MG capsule Take 1 capsule by mouth Daily. 60 capsule 4    vitamin C (ASCORBIC ACID) 250 MG tablet Take 1 tablet by mouth Daily.      VITAMIN D PO Take  by mouth.       No current facility-administered medications for this visit.       No follow-ups on file.    There are no Patient Instructions on file for this visit.         Time spent on visit:  15   minutes.  This patient has consented to a telehealth visit via video. The visit was scheduled as a video visit to comply with patient safety concerns in accordance with CDC recommendations.  All vitals recorded within this visit are reported by the patient.      Start Paxlovid.  Side effects discussed.  If you develop shortness of breath, go to the ER.

## 2024-12-19 NOTE — TELEPHONE ENCOUNTER
Caller: Citlali Bocanegra    Relationship to patient: Self    Best call back number: [phone]     Date of positive COVID19 test: 12/18/24    COVID19 symptoms: SINUS CONGESTION; ITCHY NOSE; DRAINAGE; FEVER 100.5; SLIGHT COUGH AND SNEEZING    Additional information or concerns:   PATIENT WOULD LIKE TO KNOW WHAT CAN BE DONE FOR THIS. IF SHE CAN COME IN TO BE SEEN OR HAVE A VIRTUAL VISIT.  PATIENT WOULD ALSO LIKE AN UPDATE ON COVID QUARANTINE GUIDELINES    What is the patients preferred pharmacy: [id number] [address] [place] [facility] - [phone] [facility] [phone] FX

## 2025-02-28 DIAGNOSIS — F32.A ANXIETY AND DEPRESSION: ICD-10-CM

## 2025-02-28 DIAGNOSIS — F41.9 ANXIETY AND DEPRESSION: ICD-10-CM

## 2025-02-28 RX ORDER — CITALOPRAM HYDROBROMIDE 20 MG/1
20 TABLET ORAL DAILY
Qty: 30 TABLET | Refills: 4 | Status: SHIPPED | OUTPATIENT
Start: 2025-02-28

## 2025-02-28 NOTE — TELEPHONE ENCOUNTER
LOV                  12/19/2024                 NOV                  Visit date not found  LAST REFILL     5/9/2024  PROTOCOL       [facility]

## 2025-03-04 DIAGNOSIS — F41.9 ANXIETY AND DEPRESSION: ICD-10-CM

## 2025-03-04 DIAGNOSIS — F32.A ANXIETY AND DEPRESSION: ICD-10-CM

## 2025-03-04 RX ORDER — CITALOPRAM HYDROBROMIDE 20 MG/1
20 TABLET ORAL DAILY
Qty: 30 TABLET | Refills: 2 | Status: SHIPPED | OUTPATIENT
Start: 2025-03-04 | End: 2025-03-06 | Stop reason: SDUPTHER

## 2025-03-06 ENCOUNTER — TELEPHONE (OUTPATIENT)
Dept: FAMILY MEDICINE CLINIC | Facility: CLINIC | Age: 65
End: 2025-03-06
Payer: MEDICARE

## 2025-03-06 DIAGNOSIS — F41.9 ANXIETY AND DEPRESSION: ICD-10-CM

## 2025-03-06 DIAGNOSIS — F32.A ANXIETY AND DEPRESSION: ICD-10-CM

## 2025-03-06 RX ORDER — CITALOPRAM HYDROBROMIDE 20 MG/1
20 TABLET ORAL DAILY
Qty: 90 TABLET | Refills: 1 | Status: SHIPPED | OUTPATIENT
Start: 2025-03-06

## 2025-03-06 NOTE — TELEPHONE ENCOUNTER
Caller: Citlali Bocanegra    Relationship: Self    Best call back number: [phone]     Which medication are you concerned about: citalopram (CeleXA) 20 MG tablet     Who prescribed you this medication: DR. MCKINNEY        What are your concerns: MEDICATION WAS SENT TO [facility] AND IT NEEDS TO GO TO [facility].  CAN THIS BE RESENT TO THEM?    [facility] - [address] [phone] [facility] [phone]

## 2025-06-12 ENCOUNTER — OFFICE (OUTPATIENT)
Dept: URBAN - METROPOLITAN AREA CLINIC 76 | Facility: CLINIC | Age: 65
End: 2025-06-12
Payer: MEDICARE

## 2025-06-12 VITALS
HEART RATE: 80 BPM | SYSTOLIC BLOOD PRESSURE: 112 MMHG | OXYGEN SATURATION: 98 % | HEIGHT: 63 IN | WEIGHT: 135 LBS | DIASTOLIC BLOOD PRESSURE: 66 MMHG

## 2025-06-12 DIAGNOSIS — K21.9 GASTRO-ESOPHAGEAL REFLUX DISEASE WITHOUT ESOPHAGITIS: ICD-10-CM

## 2025-06-12 DIAGNOSIS — R13.10 DYSPHAGIA, UNSPECIFIED: ICD-10-CM

## 2025-06-12 DIAGNOSIS — K22.2 ESOPHAGEAL OBSTRUCTION: ICD-10-CM

## 2025-06-12 PROCEDURE — 99213 OFFICE O/P EST LOW 20 MIN: CPT

## 2025-06-12 RX ORDER — OMEPRAZOLE 20 MG/1
20 CAPSULE, DELAYED RELEASE ORAL
Qty: 90 | Refills: 2 | Status: ACTIVE

## 2025-07-09 DIAGNOSIS — F32.A ANXIETY AND DEPRESSION: ICD-10-CM

## 2025-07-09 DIAGNOSIS — F41.9 ANXIETY AND DEPRESSION: ICD-10-CM

## 2025-07-09 RX ORDER — CITALOPRAM HYDROBROMIDE 20 MG/1
20 TABLET ORAL DAILY
Qty: 90 TABLET | Refills: 3 | Status: SHIPPED | OUTPATIENT
Start: 2025-07-09

## 2025-07-09 NOTE — TELEPHONE ENCOUNTER
LOV                  8/16/2024                    NOV                  9/18/2025  LAST REFILL    3/6/2025   PROTOCOL      [facility]

## 2025-07-15 ENCOUNTER — TELEPHONE (OUTPATIENT)
Dept: FAMILY MEDICINE CLINIC | Facility: CLINIC | Age: 65
End: 2025-07-15
Payer: MEDICARE

## (undated) DEVICE — SENSR O2 OXIMAX FNGR A/ 18IN NONSTR

## (undated) DEVICE — ESOPHAGEAL BALLOON DILATATION CATHETER: Brand: CRE FIXED WIRE

## (undated) DEVICE — FRCP BX RADJAW4 NDL 2.8 240CM LG OG BX40

## (undated) DEVICE — ADAPT CLN BIOGUARD AIR/H2O DISP

## (undated) DEVICE — TUBING, SUCTION, 1/4" X 10', STRAIGHT: Brand: MEDLINE

## (undated) DEVICE — Device: Brand: DEFENDO AIR/WATER/SUCTION AND BIOPSY VALVE

## (undated) DEVICE — DEV INFL ALLIANCE2 SYS

## (undated) DEVICE — LN SMPL CO2 SHTRM SD STREAM W/M LUER

## (undated) DEVICE — THE TORRENT IRRIGATION SCOPE CONNECTOR IS USED WITH THE TORRENT IRRIGATION TUBING TO PROVIDE IRRIGATION FLUIDS SUCH AS STERILE WATER DURING GASTROINTESTINAL ENDOSCOPIC PROCEDURES WHEN USED IN CONJUNCTION WITH AN IRRIGATION PUMP (OR ELECTROSURGICAL UNIT).: Brand: TORRENT

## (undated) DEVICE — KT ORCA ORCAPOD DISP STRL

## (undated) DEVICE — CANN O2 ETCO2 FITS ALL CONN CO2 SMPL A/ 7IN DISP LF

## (undated) DEVICE — BITEBLOCK OMNI BLOC

## (undated) DEVICE — CANNULA,ADULT,SOFT-TOUCH,7'TUBE,UC: Brand: PENDING